# Patient Record
Sex: MALE | Race: WHITE | NOT HISPANIC OR LATINO | Employment: OTHER | RURAL
[De-identification: names, ages, dates, MRNs, and addresses within clinical notes are randomized per-mention and may not be internally consistent; named-entity substitution may affect disease eponyms.]

---

## 2017-06-09 ENCOUNTER — OFFICE VISIT (OUTPATIENT)
Dept: PODIATRY | Facility: CLINIC | Age: 77
End: 2017-06-09

## 2017-06-09 VITALS — HEIGHT: 70 IN | BODY MASS INDEX: 26.63 KG/M2 | WEIGHT: 186 LBS

## 2017-06-09 DIAGNOSIS — Z92.29 HX: LONG TERM ANTICOAGULANT USE: ICD-10-CM

## 2017-06-09 DIAGNOSIS — B35.1 ONYCHOMYCOSIS: ICD-10-CM

## 2017-06-09 DIAGNOSIS — M79.671 FOOT PAIN, BILATERAL: Primary | ICD-10-CM

## 2017-06-09 DIAGNOSIS — M79.672 FOOT PAIN, BILATERAL: Primary | ICD-10-CM

## 2017-06-09 PROCEDURE — 11721 DEBRIDE NAIL 6 OR MORE: CPT | Performed by: PODIATRIST

## 2017-06-09 PROCEDURE — 99202 OFFICE O/P NEW SF 15 MIN: CPT | Performed by: PODIATRIST

## 2017-06-09 RX ORDER — LEVOTHYROXINE SODIUM 0.07 MG/1
TABLET ORAL
COMMUNITY
Start: 2017-06-01 | End: 2019-09-12 | Stop reason: SDUPTHER

## 2017-06-09 RX ORDER — SIMVASTATIN 20 MG
TABLET ORAL
COMMUNITY
Start: 2017-04-10 | End: 2019-09-12 | Stop reason: SDUPTHER

## 2017-06-09 RX ORDER — PANTOPRAZOLE SODIUM 40 MG/1
TABLET, DELAYED RELEASE ORAL
COMMUNITY
Start: 2017-04-10 | End: 2019-09-12 | Stop reason: SDUPTHER

## 2017-06-09 RX ORDER — ASPIRIN 81 MG/1
81 TABLET ORAL DAILY
COMMUNITY
End: 2021-01-01 | Stop reason: HOSPADM

## 2017-06-09 NOTE — PROGRESS NOTES
Luiz Elise  1940  76 y.o. male     Patient presents today with his wife with a complaint of bilateral great toenail and bilateral foot pain (mainly in the arch).    6/9/2017  Chief Complaint   Patient presents with   • Left Foot - nail issue, Pain   • Right Foot - nail issue, Pain           History of Present Illness    76-year-old male presents clinic today with chief, to bilateral foot pain.  Pain is primarily located to the toenails.  Patient has been present for greater than one year.  Pain is relieved with nail trimming.  Patient is unable to trim his own nails and he is on long-term anticoagulant use.  Admits to occasional numbness, burning and tingling in his feet.  He states this is not painful though.  He has no other pedal complaints.         No past medical history on file.      No past surgical history on file.      No family history on file.      Social History     Social History   • Marital status:      Spouse name: N/A   • Number of children: N/A   • Years of education: N/A     Occupational History   • Not on file.     Social History Main Topics   • Smoking status: Current Every Day Smoker     Types: Cigarettes   • Smokeless tobacco: Not on file   • Alcohol use Not on file   • Drug use: Not on file   • Sexual activity: Not on file     Other Topics Concern   • Not on file     Social History Narrative   • No narrative on file         Current Outpatient Prescriptions   Medication Sig Dispense Refill   • apixaban (ELIQUIS) 5 MG tablet tablet Take 5 mg by mouth 2 (Two) Times a Day.     • aspirin 81 MG EC tablet Take 81 mg by mouth Daily.     • levothyroxine (SYNTHROID, LEVOTHROID) 75 MCG tablet      • metoprolol tartrate (LOPRESSOR) 25 MG tablet      • pantoprazole (PROTONIX) 40 MG EC tablet      • simvastatin (ZOCOR) 20 MG tablet      • tiotropium (SPIRIVA) 18 MCG per inhalation capsule Place 1 capsule into inhaler and inhale Daily.       No current facility-administered medications for this  "visit.          OBJECTIVE    Ht 69.5\" (176.5 cm)  Wt 186 lb (84.4 kg)  BMI 27.07 kg/m2      Review of Systems   Constitutional: Negative for chills and fever.   Cardiovascular: Negative for chest pain.   Gastrointestinal: Negative for constipation, diarrhea, nausea and vomiting.   Skin: Negative for wound. long painful toenails  Musculoskeletal: foot pain      Constitutional: well developed, well nourished    HEENT: Normocephalic and atraumatic, normal hearing    Respiratory: Non labored respirations noted    Cardiovascular:    DP/PT pulses palpable    CFT brisk  to all digits  Skin temp is warm to warm from proximal tibia to distal digits  Pedal hair growth present.   No erythema or edema noted   Diffuse varicosities noted bilateral LEs    Musculoskeletal:  Muscle strength is 5/5 for all muscle groups tested   ROM of the 1st MTP is full without pain or crepitus  ROM of the MTJ is full without pain or crepitus    ROM of the STJ is full without pain or crepitus    ROM of the ankle joint is full without pain or crepitus    No pain on palpation noted    Rectus foot type     Dermatological:   Nails 1-5 are thickened, discolored, elongated with subungual debris bilateral.  Skin is warm, dry and intact    Webspaces 1-4 bilateral are clean and intact. Left 4th webspace is macerated  No subcutaneous nodules or masses noted    No open wounds noted     Neurological:   Protective sensation intact    Sensation intact to light touch    DTR intact    Psychiatric: A&O x 3 with normal mood and affect. NAD.         Procedures        ASSESSMENT AND PLAN    Luiz was seen today for nail issue, pain, nail issue and pain.    Diagnoses and all orders for this visit:    Foot pain, bilateral    Onychomycosis    HX: long term anticoagulant use    - Nails 1 through 5 bilateral were debrided in length and thickness without incident utilizing nail nippers.  - recommended daily use of lotion. Avoid the webspaces  - All questions were answered " and the patient is in agreement with the current treatment plan.  - RTC in 3 months            This document has been electronically signed by Shantanu Gillespie DPM on June 9, 2017 3:27 PM     6/9/2017  3:27 PM    EMR Dragon/Transcription disclaimer:   Much of this encounter note is an electronic transcription/translation of spoken language to printed text. The electronic translation of spoken language may permit erroneous, or at times, nonsensical words or phrases to be inadvertently transcribed; Although I have reviewed the note for such errors, some may still exist.

## 2017-09-15 ENCOUNTER — OFFICE VISIT (OUTPATIENT)
Dept: PODIATRY | Facility: CLINIC | Age: 77
End: 2017-09-15

## 2017-09-15 VITALS — BODY MASS INDEX: 26.63 KG/M2 | HEIGHT: 70 IN | WEIGHT: 186 LBS

## 2017-09-15 DIAGNOSIS — B35.1 ONYCHOMYCOSIS: Primary | ICD-10-CM

## 2017-09-15 DIAGNOSIS — M79.671 FOOT PAIN, BILATERAL: ICD-10-CM

## 2017-09-15 DIAGNOSIS — M79.672 FOOT PAIN, BILATERAL: ICD-10-CM

## 2017-09-15 DIAGNOSIS — Z92.29 HX: LONG TERM ANTICOAGULANT USE: ICD-10-CM

## 2017-09-15 PROCEDURE — 11721 DEBRIDE NAIL 6 OR MORE: CPT | Performed by: PODIATRIST

## 2017-09-15 NOTE — PROGRESS NOTES
"Luiz Arik  1940  76 y.o. male     Patient presents today for nail care.     9/15/2017  Chief Complaint   Patient presents with   • Left Foot - nail care   • Right Foot - nail care           History of Present Illness    Patient returns to clinic today for routine nail care.  States his nails become long and painful especially when walking.  He describes pain as dull and achy.  Pain is relieved with removing.  He has no other pedal complaints.        No past medical history on file.      No past surgical history on file.      No family history on file.      Social History     Social History   • Marital status:      Spouse name: N/A   • Number of children: N/A   • Years of education: N/A     Occupational History   • Not on file.     Social History Main Topics   • Smoking status: Current Every Day Smoker     Types: Cigarettes   • Smokeless tobacco: Not on file   • Alcohol use Not on file   • Drug use: Not on file   • Sexual activity: Not on file     Other Topics Concern   • Not on file     Social History Narrative         Current Outpatient Prescriptions   Medication Sig Dispense Refill   • apixaban (ELIQUIS) 5 MG tablet tablet Take 5 mg by mouth 2 (Two) Times a Day.     • aspirin 81 MG EC tablet Take 81 mg by mouth Daily.     • levothyroxine (SYNTHROID, LEVOTHROID) 75 MCG tablet      • metoprolol tartrate (LOPRESSOR) 25 MG tablet      • pantoprazole (PROTONIX) 40 MG EC tablet      • simvastatin (ZOCOR) 20 MG tablet      • tiotropium (SPIRIVA) 18 MCG per inhalation capsule Place 1 capsule into inhaler and inhale Daily.       No current facility-administered medications for this visit.          OBJECTIVE    Ht 69.5\" (176.5 cm)  Wt 186 lb (84.4 kg)  BMI 27.07 kg/m2      Review of Systems   Constitutional: Negative for chills and fever.   Cardiovascular: Negative for chest pain.   Gastrointestinal: Negative for constipation, diarrhea, nausea and vomiting.   Skin: Negative for wound. long painful " toenails  Musculoskeletal: foot pain      Constitutional: well developed, well nourished    HEENT: Normocephalic and atraumatic, normal hearing    Respiratory: Non labored respirations noted    Cardiovascular:    DP/PT pulses palpable    CFT brisk  to all digits  Skin temp is warm to warm from proximal tibia to distal digits  Pedal hair growth present.   No erythema or edema noted   Diffuse varicosities noted bilateral LEs    Musculoskeletal:  Muscle strength is 5/5 for all muscle groups tested   ROM of the 1st MTP is full without pain or crepitus  ROM of the MTJ is full without pain or crepitus    ROM of the STJ is full without pain or crepitus    ROM of the ankle joint is full without pain or crepitus    No pain on palpation noted    Rectus foot type     Dermatological:   Nails 1-5 are thickened, discolored, elongated with subungual debris bilateral.  Skin is warm, dry and intact    Webspaces 1-4 bilateral are clean and intact. Left 4th webspace is macerated  No subcutaneous nodules or masses noted    No open wounds noted     Neurological:   Protective sensation intact    Sensation intact to light touch    DTR intact    Psychiatric: A&O x 3 with normal mood and affect. NAD.         Procedures        ASSESSMENT AND PLAN    Luiz was seen today for nail care and nail care.    Diagnoses and all orders for this visit:    Onychomycosis    Foot pain, bilateral    HX: long term anticoagulant use    - Nails 1 through 5 bilateral were debrided in length and thickness without incident utilizing nail nippers.  - All questions were answered and the patient is in agreement with the current treatment plan.  - RTC in 3 months            This document has been electronically signed by Shantanu Gillespie DPM on September 15, 2017 12:54 PM     9/15/2017  12:54 PM    EMR Dragon/Transcription disclaimer:   Much of this encounter note is an electronic transcription/translation of spoken language to printed text. The electronic  translation of spoken language may permit erroneous, or at times, nonsensical words or phrases to be inadvertently transcribed; Although I have reviewed the note for such errors, some may still exist.

## 2017-12-15 ENCOUNTER — OFFICE VISIT (OUTPATIENT)
Dept: PODIATRY | Facility: CLINIC | Age: 77
End: 2017-12-15

## 2017-12-15 VITALS — WEIGHT: 191 LBS | OXYGEN SATURATION: 98 % | HEART RATE: 78 BPM | HEIGHT: 70 IN | BODY MASS INDEX: 27.35 KG/M2

## 2017-12-15 DIAGNOSIS — B35.1 ONYCHOMYCOSIS: Primary | ICD-10-CM

## 2017-12-15 DIAGNOSIS — M79.672 FOOT PAIN, BILATERAL: ICD-10-CM

## 2017-12-15 DIAGNOSIS — Z92.29 HX: LONG TERM ANTICOAGULANT USE: ICD-10-CM

## 2017-12-15 DIAGNOSIS — M79.671 FOOT PAIN, BILATERAL: ICD-10-CM

## 2017-12-15 PROCEDURE — 11721 DEBRIDE NAIL 6 OR MORE: CPT | Performed by: PODIATRIST

## 2017-12-15 NOTE — PROGRESS NOTES
"Luiz Arik  1940  77 y.o. male     Patient presents today for routine nail care.     12/15/2017  Chief Complaint   Patient presents with   • Left Foot - nail care   • Right Foot - nail care           History of Present Illness    Patient returns to clinic today for routine nail care.  States his nails become long and painful especially when walking.  He describes pain as dull and achy.  Pain is relieved with removing.  He has no other pedal complaints.        Past Medical History:   Diagnosis Date   • Onychomycosis          Past Surgical History:   Procedure Laterality Date   • CHOLECYSTECTOMY     • EYE SURGERY     • GASTROSTOMY FEEDING TUBE INSERTION     • HEMORRHOIDECTOMY     • REPLACEMENT TOTAL KNEE Right          Family History   Problem Relation Age of Onset   • Heart disease Mother    • Cancer Father    • Polycythemia Brother          Social History     Social History   • Marital status:      Spouse name: N/A   • Number of children: N/A   • Years of education: N/A     Occupational History   • Not on file.     Social History Main Topics   • Smoking status: Current Every Day Smoker     Types: Cigarettes   • Smokeless tobacco: Never Used   • Alcohol use No   • Drug use: No   • Sexual activity: Defer     Other Topics Concern   • Not on file     Social History Narrative         Current Outpatient Prescriptions   Medication Sig Dispense Refill   • apixaban (ELIQUIS) 5 MG tablet tablet Take 5 mg by mouth 2 (Two) Times a Day.     • aspirin 81 MG EC tablet Take 81 mg by mouth Daily.     • levothyroxine (SYNTHROID, LEVOTHROID) 75 MCG tablet      • metoprolol tartrate (LOPRESSOR) 25 MG tablet      • pantoprazole (PROTONIX) 40 MG EC tablet      • simvastatin (ZOCOR) 20 MG tablet      • tiotropium (SPIRIVA) 18 MCG per inhalation capsule Place 1 capsule into inhaler and inhale Daily.       No current facility-administered medications for this visit.          OBJECTIVE    Pulse 78  Ht 176.5 cm (69.5\")  Wt 86.6 " kg (191 lb)  SpO2 98%  BMI 27.8 kg/m2      Review of Systems   Constitutional: Negative for chills and fever.   Cardiovascular: Negative for chest pain.   Gastrointestinal: Negative for constipation, diarrhea, nausea and vomiting.   Skin: Negative for wound. long painful toenails  Musculoskeletal: foot pain      Constitutional: well developed, well nourished    HEENT: Normocephalic and atraumatic, normal hearing    Respiratory: Non labored respirations noted    Cardiovascular:    DP/PT pulses palpable    CFT brisk  to all digits  Skin temp is warm to warm from proximal tibia to distal digits  Pedal hair growth present.   No erythema or edema noted   Diffuse varicosities noted bilateral LEs    Musculoskeletal:  Muscle strength is 5/5 for all muscle groups tested   ROM of the 1st MTP is full without pain or crepitus  ROM of the MTJ is full without pain or crepitus    ROM of the STJ is full without pain or crepitus    ROM of the ankle joint is full without pain or crepitus    No pain on palpation noted    Rectus foot type     Dermatological:   Nails 1-5 are thickened, discolored, elongated with subungual debris bilateral.  Skin is warm, dry and intact    Webspaces 1-4 bilateral are clean and intact. Left 4th webspace is macerated  No subcutaneous nodules or masses noted    No open wounds noted     Neurological:   Protective sensation intact    Sensation intact to light touch    DTR intact    Psychiatric: A&O x 3 with normal mood and affect. NAD.         Procedures        ASSESSMENT AND PLAN    Luiz was seen today for nail care and nail care.    Diagnoses and all orders for this visit:    Onychomycosis    Foot pain, bilateral    HX: long term anticoagulant use    - Nails 1-5 bilateral were debrided in length and thickness with nail nipper and electric  to decrease fungal load and risk of infection.   - All questions were answered    - RTC in 3 months            This document has been electronically signed by  Shantanu Gillespie DPM on December 15, 2017 11:25 AM     12/15/2017  11:25 AM

## 2018-03-16 ENCOUNTER — OFFICE VISIT (OUTPATIENT)
Dept: PODIATRY | Facility: CLINIC | Age: 78
End: 2018-03-16

## 2018-03-16 VITALS — BODY MASS INDEX: 28.28 KG/M2 | WEIGHT: 190.92 LBS | HEIGHT: 69 IN

## 2018-03-16 DIAGNOSIS — Z92.29 HX: LONG TERM ANTICOAGULANT USE: ICD-10-CM

## 2018-03-16 DIAGNOSIS — B35.1 ONYCHOMYCOSIS: Primary | ICD-10-CM

## 2018-03-16 DIAGNOSIS — M79.672 FOOT PAIN, BILATERAL: ICD-10-CM

## 2018-03-16 DIAGNOSIS — M79.671 FOOT PAIN, BILATERAL: ICD-10-CM

## 2018-03-16 PROCEDURE — 11721 DEBRIDE NAIL 6 OR MORE: CPT | Performed by: PODIATRIST

## 2018-03-16 NOTE — PROGRESS NOTES
"Luiz Arik  1940  77 y.o. male     Patient presents today for routine nail care.     3/16/2018  Chief Complaint   Patient presents with   • Left Foot - routine nail trim   • Right Foot - routine nail trim           History of Present Illness    Patient returns to clinic today for routine nail care.  States his nails become long and painful especially when walking.  Pain is relieved with debridement.  He has no other pedal complaints.        Past Medical History:   Diagnosis Date   • Onychomycosis          Past Surgical History:   Procedure Laterality Date   • CHOLECYSTECTOMY     • EYE SURGERY     • GASTROSTOMY FEEDING TUBE INSERTION     • HEMORRHOIDECTOMY     • REPLACEMENT TOTAL KNEE Right          Family History   Problem Relation Age of Onset   • Heart disease Mother    • Cancer Father    • Polycythemia Brother          Social History     Social History   • Marital status:      Spouse name: N/A   • Number of children: N/A   • Years of education: N/A     Occupational History   • Not on file.     Social History Main Topics   • Smoking status: Current Every Day Smoker     Types: Cigarettes   • Smokeless tobacco: Never Used   • Alcohol use No   • Drug use: No   • Sexual activity: Defer     Other Topics Concern   • Not on file     Social History Narrative   • No narrative on file         Current Outpatient Prescriptions   Medication Sig Dispense Refill   • apixaban (ELIQUIS) 5 MG tablet tablet Take 5 mg by mouth 2 (Two) Times a Day.     • aspirin 81 MG EC tablet Take 81 mg by mouth Daily.     • levothyroxine (SYNTHROID, LEVOTHROID) 75 MCG tablet      • metoprolol tartrate (LOPRESSOR) 25 MG tablet      • pantoprazole (PROTONIX) 40 MG EC tablet      • simvastatin (ZOCOR) 20 MG tablet      • tiotropium (SPIRIVA) 18 MCG per inhalation capsule Place 1 capsule into inhaler and inhale Daily.       No current facility-administered medications for this visit.          OBJECTIVE    Ht 176.5 cm (69.49\")   Wt 86.6 kg " (190 lb 14.7 oz)   BMI 27.80 kg/m²       Review of Systems   Constitutional: Negative for chills and fever.   Cardiovascular: Negative for chest pain.   Gastrointestinal: Negative for constipation, diarrhea, nausea and vomiting.   Skin: Negative for wound. long painful toenails  Musculoskeletal: foot pain      Constitutional: well developed, well nourished    HEENT: Normocephalic and atraumatic, normal hearing    Respiratory: Non labored respirations noted    Cardiovascular:    DP/PT pulses palpable    CFT brisk  to all digits  Diffuse varicosities noted bilateral LEs    Musculoskeletal:  Muscle strength is 5/5 for all muscle groups tested   Rectus foot type     Dermatological:   Nails 1-5 are thickened, discolored, elongated with subungual debris bilateral. POP to nail plates  Small fissure noted to plantar right 5th toe crease. Dried blood noted    Neurological:   Protective sensation intact    Sensation intact to light touch    DTR intact    Psychiatric: A&O x 3 with normal mood and affect. NAD.         Procedures        ASSESSMENT AND PLAN    Luiz was seen today for routine nail trim and routine nail trim.    Diagnoses and all orders for this visit:    Onychomycosis    Foot pain, bilateral    HX: long term anticoagulant use      - Nails 1-5 bilateral were debrided in length and thickness with nail nipper and electric  to decrease fungal load and risk of infection.   - bacitracin applied to fissure in crease of toe. Pt educated on signs of infection and told to contact the office immediately or go to emergency Department if needed  - All questions were answered    - RTC in 3 months            This document has been electronically signed by Shantanu Gillespie DPM on March 16, 2018 12:00 PM     3/16/2018  12:00 PM

## 2019-09-12 ENCOUNTER — OFFICE VISIT (OUTPATIENT)
Dept: FAMILY MEDICINE CLINIC | Facility: CLINIC | Age: 79
End: 2019-09-12

## 2019-09-12 VITALS
HEART RATE: 73 BPM | BODY MASS INDEX: 27.66 KG/M2 | DIASTOLIC BLOOD PRESSURE: 62 MMHG | TEMPERATURE: 98.2 F | SYSTOLIC BLOOD PRESSURE: 128 MMHG | OXYGEN SATURATION: 96 % | HEIGHT: 70 IN | WEIGHT: 193.2 LBS
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
## 2019-09-12 PROCEDURE — 99204 OFFICE O/P NEW MOD 45 MIN: CPT | Performed by: FAMILY MEDICINE

## 2019-09-12 RX ORDER — SERTRALINE HYDROCHLORIDE 25 MG/1
25 TABLET, FILM COATED ORAL NIGHTLY
COMMUNITY
End: 2019-09-12 | Stop reason: SDUPTHER

## 2019-09-12 RX ORDER — PANTOPRAZOLE SODIUM 40 MG/1
40 TABLET, DELAYED RELEASE ORAL DAILY
Qty: 90 TABLET | Refills: 1 | Status: SHIPPED | OUTPATIENT
Start: 2019-09-12 | End: 2020-03-09 | Stop reason: SDUPTHER

## 2019-09-12 RX ORDER — SIMVASTATIN 20 MG
20 TABLET ORAL NIGHTLY
Qty: 90 TABLET | Refills: 1 | Status: SHIPPED | OUTPATIENT
Start: 2019-09-12 | End: 2020-07-21

## 2019-09-12 RX ORDER — SERTRALINE HYDROCHLORIDE 25 MG/1
25 TABLET, FILM COATED ORAL NIGHTLY
Qty: 90 TABLET | Refills: 1 | Status: SHIPPED | OUTPATIENT
Start: 2019-09-12 | End: 2020-03-09 | Stop reason: SDUPTHER

## 2019-09-12 RX ORDER — LEVOTHYROXINE SODIUM 0.07 MG/1
75 TABLET ORAL DAILY
Qty: 90 TABLET | Refills: 1 | Status: SHIPPED | OUTPATIENT
Start: 2019-09-12 | End: 2020-04-14

## 2019-09-12 NOTE — PROGRESS NOTES
Subjective Pt of Dr. Alanis.  Practice has closed.    Luiz Elise is a 78 y.o. 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 Recent R Retinopathy receiving eye injections. Hypothyroidism. Chronic anemia, Dementia, and other problems, see PMH/PSH . Pt presents for exam, to est with a new PCP.    With Wife who gives most HPI  ' Need to est with new PCP, need management of problem as noted. Fell against chair bruised R ribs, had broke in past, hurts when breathing.'     Heart Problem   This is a chronic (CAD/MI, Stents, A-Fib) problem. The current episode started more than 1 year ago. The problem occurs daily. The problem has been waxing and waning. Associated symptoms include arthralgias, chest pain, fatigue and myalgias. Pertinent negatives include no abdominal pain, chills, congestion, coughing, diaphoresis, fever, headaches, joint swelling, nausea, neck pain, numbness, rash, sore throat, vomiting or weakness. The symptoms are aggravated by smoking. Treatments tried: Meds, Stenting. The treatment provided moderate relief.   Breathing Problem   He complains of difficulty breathing. There is no cough, shortness of breath or wheezing. Primary symptoms comments: COPD. This is a chronic problem. The current episode started more than 1 year ago. The problem occurs daily. The problem has been waxing and waning. Associated symptoms include chest pain, myalgias and postnasal drip. Pertinent negatives include no appetite change, ear pain, fever, headaches, rhinorrhea, sneezing, sore throat or trouble swallowing. His symptoms are aggravated by exposure to smoke. His symptoms are alleviated by beta-agonist and ipratropium. He reports moderate improvement on treatment. Risk factors for lung disease include smoking/tobacco exposure. His past medical history is significant for COPD.    Hypothyroidism   This is a chronic problem. The current episode started more than 1 year ago. The problem occurs daily. The problem has been waxing and waning. Associated symptoms include arthralgias, chest pain, fatigue and myalgias. Pertinent negatives include no abdominal pain, chills, congestion, coughing, diaphoresis, fever, headaches, joint swelling, nausea, neck pain, numbness, rash, sore throat, vomiting or weakness. Treatments tried: Levothyroxine. The treatment provided moderate relief.   Hypertension   This is a chronic problem. The current episode started more than 1 year ago. The problem has been waxing and waning since onset. The problem is controlled. Associated symptoms include chest pain and palpitations. Pertinent negatives include no headaches, neck pain or shortness of breath. Agents associated with hypertension include thyroid hormones. Risk factors for coronary artery disease include male gender and smoking/tobacco exposure. Past treatments include beta blockers. Current antihypertension treatment includes beta blockers. The current treatment provides significant improvement. Hypertensive end-organ damage includes CVA.   Anemia   Presents for initial visit. Symptoms include bruises/bleeds easily, confusion and palpitations. There has been no abdominal pain, fever, light-headedness or pallor. Past treatments include nothing. Past medical history includes hypothyroidism.   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 This is a chronic problem. The problem occurs daily. The problem has been unchanged. Pertinent negatives include no eye discharge, eye redness, fever, itching, nausea, photophobia, vomiting or weakness. Treatments tried: Injections. The treatment provided mild relief.   Memory Loss   This is a chronic (Dementia per wife x 5-7 yrs.) problem. The current episode started more than 1 year ago. The problem occurs daily. The problem has been gradually  worsening. Associated symptoms include arthralgias, chest pain, fatigue and myalgias. Pertinent negatives include no abdominal pain, chills, congestion, coughing, diaphoresis, fever, headaches, joint swelling, nausea, neck pain, numbness, rash, sore throat, vomiting or weakness. The symptoms are aggravated by smoking (CVA, CAD). Treatments tried: Meds, Risk reduction. The treatment provided no relief.   Cerebrovascular Accident   Chronicity: L sided weakness, impaire swallow. The current episode started more than 1 year ago (2 yrs ago). The problem occurs daily. The problem has been gradually improving. Associated symptoms include arthralgias, chest pain, fatigue and myalgias. Pertinent negatives include no abdominal pain, chills, congestion, coughing, diaphoresis, fever, headaches, joint swelling, nausea, neck pain, numbness, rash, sore throat, vomiting or weakness. Treatments tried: PT/ OT. The treatment provided significant relief.   Fall   The accident occurred 12 to 24 hours ago. The fall occurred while standing. He fell from a height of 3 to 5 ft. He landed on hard floor (back of chair). Point of impact: R ribs. Pain location: R ribs. The pain is at a severity of 5/10. Pertinent negatives include no abdominal pain, fever, headaches, hematuria, nausea, numbness or vomiting. He has tried acetaminophen for the symptoms. The treatment provided mild relief.        The following portions of the patient's history were reviewed and updated as appropriate: allergies, current medications, past family history, past medical history, past social history, past surgical history and problem list.    Review of Systems   Constitutional: Positive for fatigue. Negative for activity change, appetite change, chills, diaphoresis, fever and unexpected weight change.   HENT: Positive for postnasal drip. Negative for congestion, dental problem, drooling, ear discharge, ear pain, facial swelling, hearing loss, mouth sores, nosebleeds,  rhinorrhea, sinus pressure, sinus pain, sneezing, sore throat, tinnitus, trouble swallowing and voice change.         Dentures   Eyes: Positive for visual disturbance. Negative for photophobia, pain, discharge, redness and itching.        R MD   Blind OS     [facility]    [facility]   Respiratory: Negative for apnea, cough, choking, chest tightness, shortness of breath, wheezing and stridor.         COPD.    Cardiovascular: Positive for chest pain and palpitations. Negative for leg swelling.         A-fib.    Gastrointestinal: Negative for abdominal distention, abdominal pain, anal bleeding, blood in stool, constipation, diarrhea, nausea, rectal pain and vomiting.        Bloats since CVA, S/P feeding tube.   Endocrine: Positive for cold intolerance. Negative for heat intolerance, polydipsia, polyphagia and polyuria.   Genitourinary: Positive for difficulty urinating. Negative for decreased urine volume, discharge, dysuria, enuresis, flank pain, frequency, genital sores, hematuria, penile pain, penile swelling, scrotal swelling, testicular pain and urgency.        Umbilical hernia.    Musculoskeletal: Positive for arthralgias, back pain and myalgias. Negative for gait problem, joint swelling, neck pain and neck stiffness.        Feet hurt   Skin: Negative for color change, pallor, rash and wound.        Toe nail fungus.     H/O R toe surgery.     Moles on back   Allergic/Immunologic: Positive for environmental allergies. Negative for food allergies and immunocompromised state.   Neurological: Positive for dizziness and syncope. Negative for tremors, seizures, facial asymmetry, speech difficulty, weakness, light-headedness, numbness and headaches.        L arm jerk.    Hematological: Negative for adenopathy. Bruises/bleeds easily.   Psychiatric/Behavioral: Positive for confusion and hallucinations. Negative for agitation, behavioral problems, decreased concentration, dysphoric mood, self-injury, sleep disturbance and  suicidal ideas. The patient is nervous/anxious. The patient is not hyperactive.        Objective   Physical Exam   Constitutional: He is oriented to person, place, and time. He appears well-developed and well-nourished. No distress.   HENT:   Head: Normocephalic.   Right Ear: External ear normal.   Left Ear: External ear normal.   Nose: Nose normal.   Mouth/Throat: Oropharynx is clear and moist. No oropharyngeal exudate.   Eyes: Conjunctivae are normal. Right eye exhibits no discharge. Left eye exhibits no discharge. No scleral icterus.   Blind out turning L eye.     R eye MI PRL.    Neck: No JVD present. No tracheal deviation present. No thyromegaly present.   Cardiovascular: Normal rate and normal heart sounds. Exam reveals no gallop and no friction rub.   No murmur heard.  Pulmonary/Chest: Effort normal and breath sounds normal. No stridor. No respiratory distress. He has no wheezes. He has no rales. He exhibits no tenderness.   Abdominal: Bowel sounds are normal. He exhibits no distension and no mass. There is no tenderness. There is no rebound and no guarding. A hernia is present.   Has 50 mm reducible umbilical hernia.    Musculoskeletal: Normal range of motion. He exhibits no edema, tenderness or deformity.   Lymphadenopathy:     He has no cervical adenopathy.   Neurological: He is alert and oriented to person, place, and time. He has normal reflexes. He displays normal reflexes. A cranial nerve deficit and sensory deficit is present. He exhibits normal muscle tone. Coordination normal.   L arm strenght 4/5.   Romberg +  Recog 3/3   Recall 1/3     Skin: Skin is warm and dry. Capillary refill takes 2 to 3 seconds. No rash noted. He is not diaphoretic. No erythema. No pallor.   Psychiatric: His behavior is normal.   Affect flat  Mood congruent.    Nursing note and vitals reviewed.      Assessment/Plan   Luiz was seen today for establish care and dizziness.    Diagnoses and all orders for this visit:    Fall  against object  Comments:  Hit R ribs on chair falling, with episodes A-fib  Orders:  -     XR Chest PA & Lateral (In Office)    Other orders  -     simvastatin (ZOCOR) 20 MG tablet; Take 1 tablet by mouth Every Night.  -     pantoprazole (PROTONIX) 40 MG EC tablet; Take 1 tablet by mouth Daily.  -     levothyroxine (SYNTHROID, LEVOTHROID) 75 MCG tablet; Take 1 tablet by mouth Daily.  -     sertraline (ZOLOFT) 25 MG tablet; Take 1 tablet by mouth Every Night.    Discussed exam, health problems, fall, checking x-ray. Discussed meds, indications, Tx plan, rationale. Discussed obtaining past records. Discussed F/U with specialist. Discussed F/U here.          This document has been electronically signed by Bryce Collazo MD

## 2019-09-15 NOTE — PATIENT INSTRUCTIONS
Preventive Care 65 Years and Older, Male  Preventive care refers to lifestyle choices and visits with your health care provider that can promote health and wellness.  What does preventive care include?    · A yearly physical exam. This is also called an annual well check.  · Dental exams once or twice a year.  · Routine eye exams. Ask your health care provider how often you should have your eyes checked.  · Personal lifestyle choices, including:  ? Daily care of your teeth and gums.  ? Regular physical activity.  ? Eating a healthy diet.  ? Avoiding tobacco and drug use.  ? Limiting alcohol use.  ? Practicing safe sex.  ? Taking low doses of aspirin every day.  ? Taking vitamin and mineral supplements as recommended by your health care provider.  What happens during an annual well check?  The services and screenings done by your health care provider during your annual well check will depend on your age, overall health, lifestyle risk factors, and family history of disease.  Counseling  Your health care provider may ask you questions about your:  · Alcohol use.  · Tobacco use.  · Drug use.  · Emotional well-being.  · Home and relationship well-being.  · Sexual activity.  · Eating habits.  · History of falls.  · Memory and ability to understand (cognition).  · Work and work environment.  Screening  You may have the following tests or measurements:  · Height, weight, and BMI.  · Blood pressure.  · Lipid and cholesterol levels. These may be checked every 5 years, or more frequently if you are over 50 years old.  · Skin check.  · Lung cancer screening. You may have this screening every year starting at age 55 if you have a 30-pack-year history of smoking and currently smoke or have quit within the past 15 years.  · Colorectal cancer screening. All adults should have this screening starting at age 50 and continuing until age 75. You will have tests every 1-10 years, depending on your results and the type of screening  test. People at increased risk should start screening at an earlier age. Screening tests may include:  ? Guaiac-based fecal occult blood testing.  ? Fecal immunochemical test (FIT).  ? Stool DNA test.  ? Virtual colonoscopy.  ? Sigmoidoscopy. During this test, a flexible tube with a tiny camera (sigmoidoscope) is used to examine your rectum and lower colon. The sigmoidoscope is inserted through your anus into your rectum and lower colon.  ? Colonoscopy. During this test, a long, thin, flexible tube with a tiny camera (colonoscope) is used to examine your entire colon and rectum.  · Prostate cancer screening. Recommendations will vary depending on your family history and other risks.  · Hepatitis C blood test.  · Hepatitis B blood test.  · Sexually transmitted disease (STD) testing.  · Diabetes screening. This is done by checking your blood sugar (glucose) after you have not eaten for a while (fasting). You may have this done every 1-3 years.  · Abdominal aortic aneurysm (AAA) screening. You may need this if you are a current or former smoker.  · Osteoporosis. You may be screened starting at age 70 if you are at high risk.  Talk with your health care provider about your test results, treatment options, and if necessary, the need for more tests.  Vaccines  Your health care provider may recommend certain vaccines, such as:  · Influenza vaccine. This is recommended every year.  · Tetanus, diphtheria, and acellular pertussis (Tdap, Td) vaccine. You may need a Td booster every 10 years.  · Varicella vaccine. You may need this if you have not been vaccinated.  · Zoster vaccine. You may need this after age 60.  · Measles, mumps, and rubella (MMR) vaccine. You may need at least one dose of MMR if you were born in 1957 or later. You may also need a second dose.  · Pneumococcal 13-valent conjugate (PCV13) vaccine. One dose is recommended after age 65.  · Pneumococcal polysaccharide (PPSV23) vaccine. One dose is recommended  after age 65.  · Meningococcal vaccine. You may need this if you have certain conditions.  · Hepatitis A vaccine. You may need this if you have certain conditions or if you travel or work in places where you may be exposed to hepatitis A.  · Hepatitis B vaccine. You may need this if you have certain conditions or if you travel or work in places where you may be exposed to hepatitis B.  · Haemophilus influenzae type b (Hib) vaccine. You may need this if you have certain risk factors.  Talk to your health care provider about which screenings and vaccines you need and how often you need them.  This information is not intended to replace advice given to you by your health care provider. Make sure you discuss any questions you have with your health care provider.  Document Released: 01/13/2017 Document Revised: 02/07/2019 Document Reviewed: 10/18/2016  [employer] Interactive Patient Education © 2019 [employer]

## 2019-09-17 ENCOUNTER — TELEPHONE (OUTPATIENT)
Dept: FAMILY MEDICINE CLINIC | Facility: CLINIC | Age: 79
End: 2019-09-17

## 2019-09-17 NOTE — TELEPHONE ENCOUNTER
----- Message from Bryce Collazo MD sent at 9/13/2019 12:29 PM CDT -----  Old Rib Fx, No new ones seen

## 2019-10-10 ENCOUNTER — OFFICE VISIT (OUTPATIENT)
Dept: FAMILY MEDICINE CLINIC | Facility: CLINIC | Age: 79
End: 2019-10-10

## 2019-10-10 VITALS
DIASTOLIC BLOOD PRESSURE: 62 MMHG | HEIGHT: 70 IN | WEIGHT: 197.2 LBS | OXYGEN SATURATION: 97 % | SYSTOLIC BLOOD PRESSURE: 130 MMHG | BODY MASS INDEX: 28.23 KG/M2 | HEART RATE: 85 BPM | TEMPERATURE: 98.1 F

## 2019-10-10 DIAGNOSIS — Z23 NEED FOR IMMUNIZATION AGAINST INFLUENZA: ICD-10-CM

## 2019-10-10 DIAGNOSIS — L30.9 DERMATITIS: ICD-10-CM

## 2019-10-10 DIAGNOSIS — F33.41 RECURRENT MAJOR DEPRESSIVE DISORDER, IN PARTIAL REMISSION (HCC): ICD-10-CM

## 2019-10-10 DIAGNOSIS — Z79.01 LONG TERM (CURRENT) USE OF ANTICOAGULANTS: ICD-10-CM

## 2019-10-10 DIAGNOSIS — K21.9 GERD WITHOUT ESOPHAGITIS: ICD-10-CM

## 2019-10-10 DIAGNOSIS — I48.11 LONGSTANDING PERSISTENT ATRIAL FIBRILLATION (HCC): ICD-10-CM

## 2019-10-10 DIAGNOSIS — Z00.00 ROUTINE MEDICAL EXAM: ICD-10-CM

## 2019-10-10 DIAGNOSIS — F03.90 DEMENTIA WITHOUT BEHAVIORAL DISTURBANCE, UNSPECIFIED DEMENTIA TYPE: ICD-10-CM

## 2019-10-10 DIAGNOSIS — E03.9 HYPOTHYROIDISM, UNSPECIFIED TYPE: ICD-10-CM

## 2019-10-10 DIAGNOSIS — J42 CHRONIC BRONCHITIS, UNSPECIFIED CHRONIC BRONCHITIS TYPE (HCC): ICD-10-CM

## 2019-10-10 DIAGNOSIS — Z86.73 HISTORY OF STROKE: Primary | ICD-10-CM

## 2019-10-10 PROBLEM — J44.9 COPD (CHRONIC OBSTRUCTIVE PULMONARY DISEASE) (HCC): Status: ACTIVE | Noted: 2019-10-10

## 2019-10-10 PROCEDURE — 90653 IIV ADJUVANT VACCINE IM: CPT | Performed by: FAMILY MEDICINE

## 2019-10-10 PROCEDURE — G0008 ADMIN INFLUENZA VIRUS VAC: HCPCS | Performed by: FAMILY MEDICINE

## 2019-10-10 PROCEDURE — 99214 OFFICE O/P EST MOD 30 MIN: CPT | Performed by: FAMILY MEDICINE

## 2019-10-10 NOTE — PROGRESS NOTES
Subjective   Luiz Elise is a 78 y.o. 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 Recent R Retinopathy receiving eye injections. Hypothyroidism. Chronic anemia, Dementia, and other problems, see PMH/PSH . Pt presents for exam, to discuss health problems, tx and F/U plans.  ' B/P has been OK, breathing OK. Memory about the same. Had labs from Dr. Alanis to go over',      Heart Problem   This is a chronic (CAD/MI, Stents, A-Fib) problem. The current episode started more than 1 year ago. The problem occurs daily. The problem has been waxing and waning. Associated symptoms include arthralgias, chest pain, fatigue and myalgias. Pertinent negatives include no abdominal pain, chills, congestion, coughing, diaphoresis, fever, headaches, joint swelling, nausea, neck pain, numbness, rash, sore throat, vomiting or weakness. The symptoms are aggravated by smoking. Treatments tried: Meds, Stenting. The treatment provided moderate relief.   Breathing Problem   He complains of difficulty breathing. There is no cough, shortness of breath or wheezing. Primary symptoms comments: COPD. This is a chronic problem. The current episode started more than 1 year ago. The problem occurs daily. The problem has been waxing and waning. Associated symptoms include chest pain, myalgias and postnasal drip. Pertinent negatives include no appetite change, ear pain, fever, headaches, rhinorrhea, sneezing, sore throat or trouble swallowing. His symptoms are aggravated by exposure to smoke. His symptoms are alleviated by beta-agonist and ipratropium. He reports moderate improvement on treatment. Risk factors for lung disease include smoking/tobacco exposure. His past medical history is significant for COPD.   Hypothyroidism   This is a chronic problem. The current episode started more than 1 year ago. The  problem occurs daily. The problem has been waxing and waning. Associated symptoms include arthralgias, chest pain, fatigue and myalgias. Pertinent negatives include no abdominal pain, chills, congestion, coughing, diaphoresis, fever, headaches, joint swelling, nausea, neck pain, numbness, rash, sore throat, vomiting or weakness. Treatments tried: Levothyroxine. The treatment provided moderate relief.   Hypertension   This is a chronic problem. The current episode started more than 1 year ago. The problem has been waxing and waning since onset. The problem is controlled. Associated symptoms include chest pain and palpitations. Pertinent negatives include no headaches, neck pain or shortness of breath. Agents associated with hypertension include thyroid hormones. Risk factors for coronary artery disease include male gender and smoking/tobacco exposure. Past treatments include beta blockers. Current antihypertension treatment includes beta blockers. The current treatment provides significant improvement. Hypertensive end-organ damage includes CVA.   Anemia   Presents for initial visit. Symptoms include bruises/bleeds easily, confusion and palpitations. There has been no abdominal pain, fever, light-headedness or pallor. Past treatments include nothing. Past medical history includes hypothyroidism.   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 This is a chronic problem. The problem occurs daily. The problem has been unchanged. Pertinent negatives include no eye discharge, eye redness, fever, itching, nausea, photophobia, vomiting or weakness. Treatments tried: Injections. The treatment provided mild relief.   Memory Loss   This is a chronic (Dementia per wife x 5-7 yrs.) problem. The current episode started more than 1 year ago. The problem occurs daily. The problem has been gradually worsening. Associated symptoms include arthralgias, chest pain, fatigue and myalgias. Pertinent negatives  include no abdominal pain, chills, congestion, coughing, diaphoresis, fever, headaches, joint swelling, nausea, neck pain, numbness, rash, sore throat, vomiting or weakness. The symptoms are aggravated by smoking (CVA, CAD). Treatments tried: Meds, Risk reduction. The treatment provided no relief.   Cerebrovascular Accident   Chronicity: L sided weakness, impaire swallow. The current episode started more than 1 year ago (2 yrs ago). The problem occurs daily. The problem has been gradually improving. Associated symptoms include arthralgias, chest pain, fatigue and myalgias. Pertinent negatives include no abdominal pain, chills, congestion, coughing, diaphoresis, fever, headaches, joint swelling, nausea, neck pain, numbness, rash, sore throat, vomiting or weakness. Treatments tried: PT/ OT. The treatment provided significant relief.   Fall   The accident occurred 12 to 24 hours ago. The fall occurred while standing. He fell from a height of 3 to 5 ft. He landed on hard floor (back of chair). Point of impact: R ribs. Pain location: R ribs. The pain is at a severity of 5/10. Pertinent negatives include no abdominal pain, fever, headaches, hematuria, nausea, numbness or vomiting. He has tried acetaminophen for the symptoms. The treatment provided mild relief.        The following portions of the patient's history were reviewed and updated as appropriate: allergies, current medications, past family history, past medical history, past social history, past surgical history and problem list.    Review of Systems   Constitutional: Positive for fatigue. Negative for activity change, appetite change, chills, diaphoresis, fever and unexpected weight change.   HENT: Positive for postnasal drip. Negative for congestion, dental problem, drooling, ear discharge, ear pain, facial swelling, hearing loss, mouth sores, nosebleeds, rhinorrhea, sinus pressure, sinus pain, sneezing, sore throat, tinnitus, trouble swallowing and voice  change.         Dentures   Eyes: Positive for visual disturbance. Negative for photophobia, pain, discharge, redness and itching.        R MD   Blind OS     [facility]    [facility]   Respiratory: Negative for apnea, cough, choking, chest tightness, shortness of breath, wheezing and stridor.         COPD.    Cardiovascular: Positive for chest pain and palpitations. Negative for leg swelling.         A-fib.    Gastrointestinal: Negative for abdominal distention, abdominal pain, anal bleeding, blood in stool, constipation, diarrhea, nausea, rectal pain and vomiting.        Bloats since CVA, S/P feeding tube.   Endocrine: Positive for cold intolerance. Negative for heat intolerance, polydipsia, polyphagia and polyuria.   Genitourinary: Positive for difficulty urinating. Negative for decreased urine volume, discharge, dysuria, enuresis, flank pain, frequency, genital sores, hematuria, penile pain, penile swelling, scrotal swelling, testicular pain and urgency.        Umbilical hernia.    Musculoskeletal: Positive for arthralgias, back pain and myalgias. Negative for gait problem, joint swelling, neck pain and neck stiffness.        Feet hurt   Skin: Negative for color change, pallor, rash and wound.        Toe nail fungus.     H/O R toe surgery.     Moles on back   Allergic/Immunologic: Positive for environmental allergies. Negative for food allergies and immunocompromised state.   Neurological: Positive for dizziness and syncope. Negative for tremors, seizures, facial asymmetry, speech difficulty, weakness, light-headedness, numbness and headaches.        L arm jerk.    Hematological: Negative for adenopathy. Bruises/bleeds easily.   Psychiatric/Behavioral: Positive for confusion and hallucinations. Negative for agitation, behavioral problems, decreased concentration, dysphoric mood, self-injury, sleep disturbance and suicidal ideas. The patient is nervous/anxious. The patient is not hyperactive.        Objective    Physical Exam   Constitutional: He is oriented to person, place, and time. He appears well-developed and well-nourished. No distress.   HENT:   Head: Normocephalic.   Right Ear: External ear normal.   Left Ear: External ear normal.   Nose: Nose normal.   Mouth/Throat: Oropharynx is clear and moist. No oropharyngeal exudate.   Eyes: Conjunctivae are normal. Right eye exhibits no discharge. Left eye exhibits no discharge. No scleral icterus.   Blind out turning L eye.     R eye MI PRL.    Neck: No JVD present. No tracheal deviation present. No thyromegaly present.   Cardiovascular: Normal rate and normal heart sounds. Exam reveals no gallop and no friction rub.   No murmur heard.  Pulmonary/Chest: Effort normal and breath sounds normal. No stridor. No respiratory distress. He has no wheezes. He has no rales. He exhibits no tenderness.   Abdominal: Bowel sounds are normal. He exhibits no distension and no mass. There is no tenderness. There is no rebound and no guarding. A hernia is present.   Has 50 mm reducible umbilical hernia.    Musculoskeletal: Normal range of motion. He exhibits no edema, tenderness or deformity.   Lymphadenopathy:     He has no cervical adenopathy.   Neurological: He is alert and oriented to person, place, and time. He has normal reflexes. He displays normal reflexes. A cranial nerve deficit and sensory deficit is present. He exhibits normal muscle tone. Coordination normal.   L arm strenght 4/5.   Romberg +  Recog 3/3   Recall 1/3     Skin: Skin is warm and dry. Capillary refill takes 2 to 3 seconds. No rash noted. He is not diaphoretic. No erythema. No pallor.   Psychiatric: His behavior is normal.   Affect flat  Mood congruent.    Nursing note and vitals reviewed.      Assessment/Plan   Luiz was seen today for fall.    Diagnoses and all orders for this visit:    History of stroke    Chronic bronchitis, unspecified chronic bronchitis type (CMS/HCC)  Comments:  Spiriva    Hypothyroidism,  unspecified type  Comments:  Levothyroxine  Orders:  -     TSH; Future    Routine medical exam  -     CBC & Differential; Future  -     Comprehensive metabolic panel; Future  -     TSH; Future  -     Lipid Panel; Future  -     Urinalysis With Culture If Indicated -; Future    Need for immunization against influenza  -     Fluad Quad >65 years ([id number])    GERD without esophagitis  Comments:  Protonix    Longstanding persistent atrial fibrillation  Comments:  Eliquis, Lopressor    Long term (current) use of anticoagulants    Recurrent major depressive disorder, in partial remission (CMS/[facility])  Comments:  Zoloft    Dementia without behavioral disturbance, unspecified dementia type (CMS/[facility])    Dermatitis  Comments:  Kenalog oint    Other orders  -     Discontinue: triamcinolone (KENALOG) 0.1 % ointment; Apply  topically to the appropriate area as directed 2 (Two) Times a Day. Psoriasis    Discussed exam, health problems, meds, indications, tx plan, rationale. Discussed USPSTF recommendations. Discussed F/U with specialist. Discussed F/U here.          This document has been electronically signed by Bryce Collazo MD

## 2019-10-23 PROBLEM — F33.41 RECURRENT MAJOR DEPRESSIVE DISORDER, IN PARTIAL REMISSION (HCC): Status: ACTIVE | Noted: 2019-10-23

## 2019-10-23 PROBLEM — K21.9 GERD WITHOUT ESOPHAGITIS: Status: ACTIVE | Noted: 2019-10-23

## 2019-10-23 PROBLEM — I48.11 LONGSTANDING PERSISTENT ATRIAL FIBRILLATION (HCC): Status: ACTIVE | Noted: 2019-10-23

## 2019-10-23 PROBLEM — Z00.00 ROUTINE MEDICAL EXAM: Status: ACTIVE | Noted: 2019-10-23

## 2019-10-23 PROBLEM — I48.91 ATRIAL FIBRILLATION, NEW ONSET (HCC): Status: ACTIVE | Noted: 2019-10-23

## 2019-10-23 PROBLEM — Z23 NEED FOR IMMUNIZATION AGAINST INFLUENZA: Status: ACTIVE | Noted: 2019-10-23

## 2019-10-23 PROBLEM — F03.90 DEMENTIA WITHOUT BEHAVIORAL DISTURBANCE (HCC): Status: ACTIVE | Noted: 2019-10-23

## 2019-10-23 PROBLEM — Z86.73 HISTORY OF STROKE: Status: ACTIVE | Noted: 2019-10-23

## 2019-10-23 PROBLEM — L30.9 DERMATITIS: Status: ACTIVE | Noted: 2019-10-23

## 2019-10-23 PROBLEM — Z79.01 LONG TERM (CURRENT) USE OF ANTICOAGULANTS: Status: ACTIVE | Noted: 2019-10-23

## 2019-10-24 NOTE — PATIENT INSTRUCTIONS
Preventive Care 65 Years and Older, Male  Preventive care refers to lifestyle choices and visits with your health care provider that can promote health and wellness.  What does preventive care include?    · A yearly physical exam. This is also called an annual well check.  · Dental exams once or twice a year.  · Routine eye exams. Ask your health care provider how often you should have your eyes checked.  · Personal lifestyle choices, including:  ? Daily care of your teeth and gums.  ? Regular physical activity.  ? Eating a healthy diet.  ? Avoiding tobacco and drug use.  ? Limiting alcohol use.  ? Practicing safe sex.  ? Taking low doses of aspirin every day.  ? Taking vitamin and mineral supplements as recommended by your health care provider.  What happens during an annual well check?  The services and screenings done by your health care provider during your annual well check will depend on your age, overall health, lifestyle risk factors, and family history of disease.  Counseling  Your health care provider may ask you questions about your:  · Alcohol use.  · Tobacco use.  · Drug use.  · Emotional well-being.  · Home and relationship well-being.  · Sexual activity.  · Eating habits.  · History of falls.  · Memory and ability to understand (cognition).  · Work and work environment.  Screening  You may have the following tests or measurements:  · Height, weight, and BMI.  · Blood pressure.  · Lipid and cholesterol levels. These may be checked every 5 years, or more frequently if you are over 50 years old.  · Skin check.  · Lung cancer screening. You may have this screening every year starting at age 55 if you have a 30-pack-year history of smoking and currently smoke or have quit within the past 15 years.  · Colorectal cancer screening. All adults should have this screening starting at age 50 and continuing until age 75. You will have tests every 1-10 years, depending on your results and the type of screening  test. People at increased risk should start screening at an earlier age. Screening tests may include:  ? Guaiac-based fecal occult blood testing.  ? Fecal immunochemical test (FIT).  ? Stool DNA test.  ? Virtual colonoscopy.  ? Sigmoidoscopy. During this test, a flexible tube with a tiny camera (sigmoidoscope) is used to examine your rectum and lower colon. The sigmoidoscope is inserted through your anus into your rectum and lower colon.  ? Colonoscopy. During this test, a long, thin, flexible tube with a tiny camera (colonoscope) is used to examine your entire colon and rectum.  · Prostate cancer screening. Recommendations will vary depending on your family history and other risks.  · Hepatitis C blood test.  · Hepatitis B blood test.  · Sexually transmitted disease (STD) testing.  · Diabetes screening. This is done by checking your blood sugar (glucose) after you have not eaten for a while (fasting). You may have this done every 1-3 years.  · Abdominal aortic aneurysm (AAA) screening. You may need this if you are a current or former smoker.  · Osteoporosis. You may be screened starting at age 70 if you are at high risk.  Talk with your health care provider about your test results, treatment options, and if necessary, the need for more tests.  Vaccines  Your health care provider may recommend certain vaccines, such as:  · Influenza vaccine. This is recommended every year.  · Tetanus, diphtheria, and acellular pertussis (Tdap, Td) vaccine. You may need a Td booster every 10 years.  · Varicella vaccine. You may need this if you have not been vaccinated.  · Zoster vaccine. You may need this after age 60.  · Measles, mumps, and rubella (MMR) vaccine. You may need at least one dose of MMR if you were born in 1957 or later. You may also need a second dose.  · Pneumococcal 13-valent conjugate (PCV13) vaccine. One dose is recommended after age 65.  · Pneumococcal polysaccharide (PPSV23) vaccine. One dose is recommended  after age 65.  · Meningococcal vaccine. You may need this if you have certain conditions.  · Hepatitis A vaccine. You may need this if you have certain conditions or if you travel or work in places where you may be exposed to hepatitis A.  · Hepatitis B vaccine. You may need this if you have certain conditions or if you travel or work in places where you may be exposed to hepatitis B.  · Haemophilus influenzae type b (Hib) vaccine. You may need this if you have certain risk factors.  Talk to your health care provider about which screenings and vaccines you need and how often you need them.  This information is not intended to replace advice given to you by your health care provider. Make sure you discuss any questions you have with your health care provider.  Document Released: 01/13/2017 Document Revised: 02/07/2019 Document Reviewed: 10/18/2016  [employer] Interactive Patient Education © 2019 [employer]

## 2019-12-13 ENCOUNTER — LAB (OUTPATIENT)
Dept: LAB | Facility: HOSPITAL | Age: 79
End: 2019-12-13

## 2019-12-13 DIAGNOSIS — E03.9 HYPOTHYROIDISM, UNSPECIFIED TYPE: ICD-10-CM

## 2019-12-13 DIAGNOSIS — Z00.00 ROUTINE MEDICAL EXAM: ICD-10-CM

## 2019-12-13 PROCEDURE — 80053 COMPREHEN METABOLIC PANEL: CPT

## 2019-12-13 PROCEDURE — 80061 LIPID PANEL: CPT

## 2019-12-13 PROCEDURE — 84443 ASSAY THYROID STIM HORMONE: CPT

## 2019-12-13 PROCEDURE — 85025 COMPLETE CBC W/AUTO DIFF WBC: CPT

## 2019-12-14 LAB
ALBUMIN SERPL-MCNC: 4.4 G/DL (ref 3.5–5.2)
ALBUMIN/GLOB SERPL: 1.2 G/DL
ALP SERPL-CCNC: 63 U/L (ref 39–117)
ALT SERPL W P-5'-P-CCNC: 16 U/L (ref 1–41)
ANION GAP SERPL CALCULATED.3IONS-SCNC: 13.7 MMOL/L (ref 5–15)
AST SERPL-CCNC: 25 U/L (ref 1–40)
BASOPHILS # BLD AUTO: 0.04 10*3/MM3 (ref 0–0.2)
BASOPHILS NFR BLD AUTO: 0.7 % (ref 0–1.5)
BILIRUB SERPL-MCNC: 0.3 MG/DL (ref 0.2–1.2)
BUN BLD-MCNC: 9 MG/DL (ref 8–23)
BUN/CREAT SERPL: 10.6 (ref 7–25)
CALCIUM SPEC-SCNC: 9.3 MG/DL (ref 8.6–10.5)
CHLORIDE SERPL-SCNC: 103 MMOL/L (ref 98–107)
CHOLEST SERPL-MCNC: 112 MG/DL (ref 0–200)
CO2 SERPL-SCNC: 22.3 MMOL/L (ref 22–29)
CREAT BLD-MCNC: 0.85 MG/DL (ref 0.76–1.27)
DEPRECATED RDW RBC AUTO: 43.8 FL (ref 37–54)
EOSINOPHIL # BLD AUTO: 0.18 10*3/MM3 (ref 0–0.4)
EOSINOPHIL NFR BLD AUTO: 3.3 % (ref 0.3–6.2)
ERYTHROCYTE [DISTWIDTH] IN BLOOD BY AUTOMATED COUNT: 15.9 % (ref 12.3–15.4)
GFR SERPL CREATININE-BSD FRML MDRD: 87 ML/MIN/1.73
GLOBULIN UR ELPH-MCNC: 3.8 GM/DL
GLUCOSE BLD-MCNC: 87 MG/DL (ref 65–99)
HCT VFR BLD AUTO: 35.1 % (ref 37.5–51)
HDLC SERPL-MCNC: 31 MG/DL (ref 40–60)
HGB BLD-MCNC: 11.3 G/DL (ref 13–17.7)
IMM GRANULOCYTES # BLD AUTO: 0.02 10*3/MM3 (ref 0–0.05)
IMM GRANULOCYTES NFR BLD AUTO: 0.4 % (ref 0–0.5)
LDLC SERPL CALC-MCNC: 56 MG/DL (ref 0–100)
LDLC/HDLC SERPL: 1.81 {RATIO}
LYMPHOCYTES # BLD AUTO: 1.59 10*3/MM3 (ref 0.7–3.1)
LYMPHOCYTES NFR BLD AUTO: 28.8 % (ref 19.6–45.3)
MCH RBC QN AUTO: 24.7 PG (ref 26.6–33)
MCHC RBC AUTO-ENTMCNC: 32.2 G/DL (ref 31.5–35.7)
MCV RBC AUTO: 76.8 FL (ref 79–97)
MONOCYTES # BLD AUTO: 0.49 10*3/MM3 (ref 0.1–0.9)
MONOCYTES NFR BLD AUTO: 8.9 % (ref 5–12)
NEUTROPHILS # BLD AUTO: 3.2 10*3/MM3 (ref 1.7–7)
NEUTROPHILS NFR BLD AUTO: 57.9 % (ref 42.7–76)
NRBC BLD AUTO-RTO: 0 /100 WBC (ref 0–0.2)
PLATELET # BLD AUTO: 225 10*3/MM3 (ref 140–450)
PMV BLD AUTO: 12.5 FL (ref 6–12)
POTASSIUM BLD-SCNC: 4 MMOL/L (ref 3.5–5.2)
PROT SERPL-MCNC: 8.2 G/DL (ref 6–8.5)
RBC # BLD AUTO: 4.57 10*6/MM3 (ref 4.14–5.8)
SODIUM BLD-SCNC: 139 MMOL/L (ref 136–145)
TRIGL SERPL-MCNC: 124 MG/DL (ref 0–150)
TSH SERPL DL<=0.05 MIU/L-ACNC: 1.8 UIU/ML (ref 0.27–4.2)
VLDLC SERPL-MCNC: 24.8 MG/DL (ref 5–40)
WBC NRBC COR # BLD: 5.52 10*3/MM3 (ref 3.4–10.8)

## 2019-12-16 ENCOUNTER — LAB (OUTPATIENT)
Dept: LAB | Facility: HOSPITAL | Age: 79
End: 2019-12-16

## 2019-12-16 DIAGNOSIS — Z00.00 ROUTINE MEDICAL EXAM: ICD-10-CM

## 2019-12-16 LAB
BILIRUB UR QL STRIP: NEGATIVE
CLARITY UR: CLEAR
COLOR UR: YELLOW
GLUCOSE UR STRIP-MCNC: NEGATIVE MG/DL
HGB UR QL STRIP.AUTO: NEGATIVE
KETONES UR QL STRIP: NEGATIVE
LEUKOCYTE ESTERASE UR QL STRIP.AUTO: NEGATIVE
NITRITE UR QL STRIP: NEGATIVE
PH UR STRIP.AUTO: 6.5 [PH] (ref 5–8)
PROT UR QL STRIP: NEGATIVE
SP GR UR STRIP: 1.01 (ref 1–1.03)
UROBILINOGEN UR QL STRIP: NORMAL

## 2019-12-16 PROCEDURE — 81003 URINALYSIS AUTO W/O SCOPE: CPT

## 2019-12-17 ENCOUNTER — TELEPHONE (OUTPATIENT)
Dept: FAMILY MEDICINE CLINIC | Facility: CLINIC | Age: 79
End: 2019-12-17

## 2019-12-17 NOTE — TELEPHONE ENCOUNTER
----- Message from Bryce Collazo MD sent at 12/15/2019  9:43 AM CST -----  Has mild anemia, otherwise labs good will go over at next visit.

## 2019-12-20 ENCOUNTER — OFFICE VISIT (OUTPATIENT)
Dept: FAMILY MEDICINE CLINIC | Facility: CLINIC | Age: 79
End: 2019-12-20

## 2019-12-20 VITALS
BODY MASS INDEX: 27.47 KG/M2 | HEIGHT: 70 IN | HEART RATE: 67 BPM | DIASTOLIC BLOOD PRESSURE: 60 MMHG | OXYGEN SATURATION: 97 % | WEIGHT: 191.9 LBS | TEMPERATURE: 98.1 F | SYSTOLIC BLOOD PRESSURE: 134 MMHG

## 2019-12-20 DIAGNOSIS — E03.9 HYPOTHYROIDISM, UNSPECIFIED TYPE: ICD-10-CM

## 2019-12-20 DIAGNOSIS — F33.41 RECURRENT MAJOR DEPRESSIVE DISORDER, IN PARTIAL REMISSION (HCC): ICD-10-CM

## 2019-12-20 DIAGNOSIS — F03.90 DEMENTIA WITHOUT BEHAVIORAL DISTURBANCE, UNSPECIFIED DEMENTIA TYPE: ICD-10-CM

## 2019-12-20 DIAGNOSIS — Z79.01 LONG TERM (CURRENT) USE OF ANTICOAGULANTS: ICD-10-CM

## 2019-12-20 DIAGNOSIS — Z00.00 ROUTINE MEDICAL EXAM: ICD-10-CM

## 2019-12-20 DIAGNOSIS — I48.11 LONGSTANDING PERSISTENT ATRIAL FIBRILLATION (HCC): ICD-10-CM

## 2019-12-20 DIAGNOSIS — J42 CHRONIC BRONCHITIS, UNSPECIFIED CHRONIC BRONCHITIS TYPE (HCC): ICD-10-CM

## 2019-12-20 DIAGNOSIS — K21.9 GERD WITHOUT ESOPHAGITIS: ICD-10-CM

## 2019-12-20 PROCEDURE — 99214 OFFICE O/P EST MOD 30 MIN: CPT | Performed by: FAMILY MEDICINE

## 2020-01-01 ENCOUNTER — OFFICE VISIT (OUTPATIENT)
Dept: FAMILY MEDICINE CLINIC | Facility: CLINIC | Age: 80
End: 2020-01-01

## 2020-01-01 ENCOUNTER — HOSPITAL ENCOUNTER (EMERGENCY)
Facility: HOSPITAL | Age: 80
Discharge: LEFT BEFORE DISCHARGE | End: 2020-12-28

## 2020-01-01 ENCOUNTER — PROCEDURE VISIT (OUTPATIENT)
Dept: FAMILY MEDICINE CLINIC | Facility: CLINIC | Age: 80
End: 2020-01-01

## 2020-01-01 ENCOUNTER — NURSE TRIAGE (OUTPATIENT)
Dept: CALL CENTER | Facility: HOSPITAL | Age: 80
End: 2020-01-01

## 2020-01-01 VITALS
WEIGHT: 188.5 LBS | HEIGHT: 70 IN | HEART RATE: 76 BPM | DIASTOLIC BLOOD PRESSURE: 60 MMHG | BODY MASS INDEX: 26.99 KG/M2 | SYSTOLIC BLOOD PRESSURE: 136 MMHG | OXYGEN SATURATION: 94 % | TEMPERATURE: 97.5 F

## 2020-01-01 VITALS
OXYGEN SATURATION: 94 % | WEIGHT: 190 LBS | HEIGHT: 69 IN | SYSTOLIC BLOOD PRESSURE: 146 MMHG | DIASTOLIC BLOOD PRESSURE: 81 MMHG | TEMPERATURE: 97.9 F | HEART RATE: 82 BPM | BODY MASS INDEX: 28.14 KG/M2 | RESPIRATION RATE: 24 BRPM

## 2020-01-01 VITALS
SYSTOLIC BLOOD PRESSURE: 144 MMHG | TEMPERATURE: 97.1 F | WEIGHT: 189 LBS | BODY MASS INDEX: 27.06 KG/M2 | OXYGEN SATURATION: 97 % | HEART RATE: 71 BPM | HEIGHT: 70 IN | DIASTOLIC BLOOD PRESSURE: 60 MMHG

## 2020-01-01 VITALS
SYSTOLIC BLOOD PRESSURE: 134 MMHG | WEIGHT: 186 LBS | TEMPERATURE: 98.2 F | DIASTOLIC BLOOD PRESSURE: 60 MMHG | OXYGEN SATURATION: 97 % | HEIGHT: 70 IN | BODY MASS INDEX: 26.63 KG/M2 | HEART RATE: 67 BPM

## 2020-01-01 DIAGNOSIS — L57.0 KERATOSIS: ICD-10-CM

## 2020-01-01 DIAGNOSIS — F03.90 DEMENTIA WITHOUT BEHAVIORAL DISTURBANCE, UNSPECIFIED DEMENTIA TYPE: ICD-10-CM

## 2020-01-01 DIAGNOSIS — J42 CHRONIC BRONCHITIS, UNSPECIFIED CHRONIC BRONCHITIS TYPE (HCC): ICD-10-CM

## 2020-01-01 DIAGNOSIS — L57.0 ACTINIC KERATOSIS: ICD-10-CM

## 2020-01-01 DIAGNOSIS — Z87.01 H/O: PNEUMONIA: ICD-10-CM

## 2020-01-01 DIAGNOSIS — Z91.81 AT HIGH RISK FOR INJURY RELATED TO FALL: ICD-10-CM

## 2020-01-01 DIAGNOSIS — R53.81 PHYSICAL DECONDITIONING: ICD-10-CM

## 2020-01-01 DIAGNOSIS — Z23 NEED FOR IMMUNIZATION AGAINST INFLUENZA: ICD-10-CM

## 2020-01-01 DIAGNOSIS — Z00.00 MEDICARE ANNUAL WELLNESS VISIT, SUBSEQUENT: Primary | ICD-10-CM

## 2020-01-01 DIAGNOSIS — Z86.73 HISTORY OF STROKE: ICD-10-CM

## 2020-01-01 DIAGNOSIS — R42 DIZZINESS: ICD-10-CM

## 2020-01-01 DIAGNOSIS — R26.81 UNSTEADY GAIT: ICD-10-CM

## 2020-01-01 PROCEDURE — G0008 ADMIN INFLUENZA VIRUS VAC: HCPCS | Performed by: FAMILY MEDICINE

## 2020-01-01 PROCEDURE — 99211 OFF/OP EST MAY X REQ PHY/QHP: CPT

## 2020-01-01 PROCEDURE — 99214 OFFICE O/P EST MOD 30 MIN: CPT | Performed by: FAMILY MEDICINE

## 2020-01-01 PROCEDURE — G0439 PPPS, SUBSEQ VISIT: HCPCS | Performed by: FAMILY MEDICINE

## 2020-01-01 PROCEDURE — 99213 OFFICE O/P EST LOW 20 MIN: CPT | Performed by: FAMILY MEDICINE

## 2020-01-01 PROCEDURE — 96160 PT-FOCUSED HLTH RISK ASSMT: CPT | Performed by: FAMILY MEDICINE

## 2020-01-01 PROCEDURE — 90694 VACC AIIV4 NO PRSRV 0.5ML IM: CPT | Performed by: FAMILY MEDICINE

## 2020-01-01 RX ORDER — METOPROLOL SUCCINATE 25 MG/1
1 TABLET, EXTENDED RELEASE ORAL DAILY
COMMUNITY
Start: 2020-01-01 | End: 2021-01-01 | Stop reason: SDUPTHER

## 2020-01-01 RX ORDER — SERTRALINE HYDROCHLORIDE 25 MG/1
TABLET, FILM COATED ORAL
Qty: 90 TABLET | Refills: 1 | Status: SHIPPED | OUTPATIENT
Start: 2020-01-01 | End: 2021-01-01 | Stop reason: SDUPTHER

## 2020-01-01 RX ORDER — CLOPIDOGREL BISULFATE 75 MG/1
75 TABLET ORAL DAILY
COMMUNITY
Start: 2020-09-24 | End: 2021-01-01 | Stop reason: ALTCHOICE

## 2020-01-01 RX ORDER — PANTOPRAZOLE SODIUM 40 MG/1
TABLET, DELAYED RELEASE ORAL
Qty: 90 TABLET | Refills: 1 | Status: SHIPPED | OUTPATIENT
Start: 2020-01-01 | End: 2021-01-01 | Stop reason: SDUPTHER

## 2020-01-01 RX ORDER — AMMONIUM LACTATE 12 G/100G
CREAM TOPICAL AS NEEDED
Qty: 1 EACH | Refills: 3 | Status: SHIPPED | OUTPATIENT
Start: 2020-01-01 | End: 2021-01-01 | Stop reason: HOSPADM

## 2020-01-01 RX ORDER — LEVOTHYROXINE SODIUM 0.07 MG/1
TABLET ORAL
Qty: 90 TABLET | Refills: 1 | Status: SHIPPED | OUTPATIENT
Start: 2020-01-01 | End: 2021-01-01 | Stop reason: SDUPTHER

## 2020-01-01 RX ORDER — AMMONIUM LACTATE 12 G/100G
CREAM TOPICAL AS NEEDED
Qty: 1 EACH | Refills: 3 | Status: SHIPPED | OUTPATIENT
Start: 2020-01-01 | End: 2020-01-01 | Stop reason: SDUPTHER

## 2020-01-03 NOTE — PATIENT INSTRUCTIONS
Health Maintenance After Age 65  After age 65, you are at a higher risk for certain long-term diseases and infections as well as injuries from falls. Falls are a major cause of broken bones and head injuries in people who are older than age 65. Getting regular preventive care can help to keep you healthy and well. Preventive care includes getting regular testing and making lifestyle changes as recommended by your health care provider. Talk with your health care provider about:  · Which screenings and tests you should have. A screening is a test that checks for a disease when you have no symptoms.  · A diet and exercise plan that is right for you.  What should I know about screenings and tests to prevent falls?  Screening and testing are the best ways to find a health problem early. Early diagnosis and treatment give you the best chance of managing medical conditions that are common after age 65. Certain conditions and lifestyle choices may make you more likely to have a fall. Your health care provider may recommend:  · Regular vision checks. Poor vision and conditions such as cataracts can make you more likely to have a fall. If you wear glasses, make sure to get your prescription updated if your vision changes.  · Medicine review. Work with your health care provider to regularly review all of the medicines you are taking, including over-the-counter medicines. Ask your health care provider about any side effects that may make you more likely to have a fall. Tell your health care provider if any medicines that you take make you feel dizzy or sleepy.  · Osteoporosis screening. Osteoporosis is a condition that causes the bones to get weaker. This can make the bones weak and cause them to break more easily.  · Blood pressure screening. Blood pressure changes and medicines to control blood pressure can make you feel dizzy.  · Strength and balance checks. Your health care provider may recommend certain tests to check your  strength and balance while standing, walking, or changing positions.  · Foot health exam. Foot pain and numbness, as well as not wearing proper footwear, can make you more likely to have a fall.  · Depression screening. You may be more likely to have a fall if you have a fear of falling, feel emotionally low, or feel unable to do activities that you used to do.  · Alcohol use screening. Using too much alcohol can affect your balance and may make you more likely to have a fall.  What actions can I take to lower my risk of falls?  General instructions  · Talk with your health care provider about your risks for falling. Tell your health care provider if:  ? You fall. Be sure to tell your health care provider about all falls, even ones that seem minor.  ? You feel dizzy, sleepy, or off-balance.  · Take over-the-counter and prescription medicines only as told by your health care provider. These include any supplements.  · Eat a healthy diet and maintain a healthy weight. A healthy diet includes low-fat dairy products, low-fat (lean) meats, and fiber from whole grains, beans, and lots of fruits and vegetables.  Home safety  · Remove any tripping hazards, such as rugs, cords, and clutter.  · Install safety equipment such as grab bars in bathrooms and safety rails on stairs.  · Keep rooms and walkways well-lit.  Activity    · Follow a regular exercise program to stay fit. This will help you maintain your balance. Ask your health care provider what types of exercise are appropriate for you.  · If you need a cane or walker, use it as recommended by your health care provider.  · Wear supportive shoes that have nonskid soles.  Lifestyle  · Do not drink alcohol if your health care provider tells you not to drink.  · If you drink alcohol, limit how much you have:  ? 0-1 drink a day for women.  ? 0-2 drinks a day for men.  · Be aware of how much alcohol is in your drink. In the [place] one drink equals one typical bottle of beer (12  oz), one-half glass of wine (5 oz), or one shot of hard liquor (1½ oz).  · Do not use any products that contain nicotine or tobacco, such as cigarettes and e-cigarettes. If you need help quitting, ask your health care provider.  Summary  · Having a healthy lifestyle and getting preventive care can help to protect your health and wellness after age 65.  · Screening and testing are the best way to find a health problem early and help you avoid having a fall. Early diagnosis and treatment give you the best chance for managing medical conditions that are more common for people who are older than age 65.  · Falls are a major cause of broken bones and head injuries in people who are older than age 65. Take precautions to prevent a fall at home.  · Work with your health care provider to learn what changes you can make to improve your health and wellness and to prevent falls.  This information is not intended to replace advice given to you by your health care provider. Make sure you discuss any questions you have with your health care provider.  Document Released: 10/31/2018 Document Revised: 10/31/2018 Document Reviewed: 10/31/2018  [employer] Interactive Patient Education © 2019 [employer]      Stroke Prevention  Some medical conditions and lifestyle choices can lead to a higher risk for a stroke. You can help to prevent a stroke by making nutrition, lifestyle, and other changes.  What nutrition changes can be made?    · Eat healthy foods.  ? Choose foods that are high in fiber. These include:  § Fresh fruits.  § Fresh vegetables.  § Whole grains.  ? Eat at least 5 or more servings of fruits and vegetables each day. Try to fill half of your plate at each meal with fruits and vegetables.  ? Choose lean protein foods. These include:  § Lowfat (lean) cuts of meat.  § Chicken without skin.  § Fish.  § Tofu.  § Beans.  § Nuts.  ? Eat low-fat dairy products.  ? Avoid foods that:  § Are high in salt (sodium).  § Have saturated  fat.  § Have trans fat.  § Have cholesterol.  § Are processed.  § Are premade.  · Follow eating guidelines as told by your doctor. These may include:  ? Reducing how many calories you eat and drink each day.  ? Limiting how much salt you eat or drink each day to 1,500 milligrams (mg).  ? Using only healthy fats for cooking. These include:  § Olive oil.  § Canola oil.  § Sunflower oil.  ? Counting how many carbohydrates you eat and drink each day.  What lifestyle changes can be made?  · Try to stay at a healthy weight. Talk to your doctor about what a good weight is for you.  · Get at least 30 minutes of moderate physical activity at least 5 days a week. This can include:  ? Fast walking.  ? Biking.  ? Swimming.  · Do not use any products that have nicotine or tobacco. This includes cigarettes and e-cigarettes. If you need help quitting, ask your doctor. Avoid being around tobacco smoke in general.  · Limit how much alcohol you drink to no more than 1 drink a day for nonpregnant women and 2 drinks a day for men. One drink equals 12 oz of beer, 5 oz of wine, or 1½ oz of hard liquor.  · Do not use drugs.  · Avoid taking birth control pills. Talk to your doctor about the risks of taking birth control pills if:  ? You are over 35 years old.  ? You smoke.  ? You get migraines.  ? You have had a blood clot.  What other changes can be made?  · Manage your cholesterol.  ? It is important to eat a healthy diet.  ? If your cholesterol cannot be managed through your diet, you may also need to take medicines. Take medicines as told by your doctor.  · Manage your diabetes.  ? It is important to eat a healthy diet and to exercise regularly.  ? If your blood sugar cannot be managed through diet and exercise, you may need to take medicines. Take medicines as told by your doctor.  · Control your high blood pressure (hypertension).  ? Try to keep your blood pressure below 130/80. This can help lower your risk of stroke.  ? It is  "important to eat a healthy diet and to exercise regularly.  ? If your blood pressure cannot be managed through diet and exercise, you may need to take medicines. Take medicines as told by your doctor.  ? Ask your doctor if you should check your blood pressure at home.  ? Have your blood pressure checked every year. Do this even if your blood pressure is normal.  · Talk to your doctor about getting checked for a sleep disorder. Signs of this can include:  ? Snoring a lot.  ? Feeling very tired.  · Take over-the-counter and prescription medicines only as told by your doctor. These may include aspirin or blood thinners (antiplatelets or anticoagulants).  · Make sure that any other medical conditions you have are managed.  Where to find more information  · American Stroke Association: www.strokeassociation.org  · National Stroke Association: www.stroke.org  Get help right away if:  · You have any symptoms of stroke. \"BE FAST\" is an easy way to remember the main warning signs:  ? B - Balance. Signs are dizziness, sudden trouble walking, or loss of balance.  ? E - Eyes. Signs are trouble seeing or a sudden change in how you see.  ? F - Face. Signs are sudden weakness or loss of feeling of the face, or the face or eyelid drooping on one side.  ? A - Arms. Signs are weakness or loss of feeling in an arm. This happens suddenly and usually on one side of the body.  ? S - Speech. Signs are sudden trouble speaking, slurred speech, or trouble understanding what people say.  ? T - Time. Time to call emergency services. Write down what time symptoms started.  · You have other signs of stroke, such as:  ? A sudden, very bad headache with no known cause.  ? Feeling sick to your stomach (nausea).  ? Throwing up (vomiting).  ? Jerky movements you cannot control (seizure).  These symptoms may represent a serious problem that is an emergency. Do not wait to see if the symptoms will go away. Get medical help right away. Call your local " emergency services (911 in the [place] Do not drive yourself to the hospital.  Summary  · You can prevent a stroke by eating healthy, exercising, not smoking, drinking less alcohol, and treating other health problems, such as diabetes, high blood pressure, or high cholesterol.  · Do not use any products that contain nicotine or tobacco, such as cigarettes and e-cigarettes.  · Get help right away if you have any signs or symptoms of a stroke.  This information is not intended to replace advice given to you by your health care provider. Make sure you discuss any questions you have with your health care provider.  Document Released: 06/18/2013 Document Revised: 03/21/2018 Document Reviewed: 03/21/2018  [employer] Interactive Patient Education © 2019 [employer]

## 2020-02-19 ENCOUNTER — TELEPHONE (OUTPATIENT)
Dept: FAMILY MEDICINE CLINIC | Facility: CLINIC | Age: 80
End: 2020-02-19

## 2020-02-19 NOTE — TELEPHONE ENCOUNTER
Patient's wife Katarina called and requested an appointment for tomorrow with Dr. Collazo. She said that he has been to the bathroom several times more than normal today and that he has been having urinary troubles in general.    I offered a 3/2/2020 appointment but she was hoping to get him seen tomorrow, I let her know that I would put a note back to see if that was a possibility. She stated that if that were not an option that she would take him to the walk-in clinic. He really wanted to be seen by Dr. Collazo.    Katarina can be contacted on behalf of the patient at [phone] to clarify and confirm.

## 2020-03-09 ENCOUNTER — OFFICE VISIT (OUTPATIENT)
Dept: FAMILY MEDICINE CLINIC | Facility: CLINIC | Age: 80
End: 2020-03-09

## 2020-03-09 VITALS
HEART RATE: 72 BPM | SYSTOLIC BLOOD PRESSURE: 130 MMHG | DIASTOLIC BLOOD PRESSURE: 60 MMHG | TEMPERATURE: 98 F | WEIGHT: 195.9 LBS | BODY MASS INDEX: 28.05 KG/M2 | HEIGHT: 70 IN | OXYGEN SATURATION: 94 %

## 2020-03-09 DIAGNOSIS — Z86.73 HISTORY OF STROKE: ICD-10-CM

## 2020-03-09 DIAGNOSIS — Z79.01 LONG TERM (CURRENT) USE OF ANTICOAGULANTS: ICD-10-CM

## 2020-03-09 DIAGNOSIS — F03.90 DEMENTIA WITHOUT BEHAVIORAL DISTURBANCE, UNSPECIFIED DEMENTIA TYPE: ICD-10-CM

## 2020-03-09 DIAGNOSIS — J22 LOWER RESPIRATORY INFECTION (E.G., BRONCHITIS, PNEUMONIA, PNEUMONITIS, PULMONITIS): ICD-10-CM

## 2020-03-09 PROCEDURE — 99214 OFFICE O/P EST MOD 30 MIN: CPT | Performed by: FAMILY MEDICINE

## 2020-03-09 PROCEDURE — 96372 THER/PROPH/DIAG INJ SC/IM: CPT | Performed by: FAMILY MEDICINE

## 2020-03-09 RX ORDER — AZITHROMYCIN 250 MG/1
TABLET, FILM COATED ORAL
Qty: 6 TABLET | Refills: 0 | Status: SHIPPED | OUTPATIENT
Start: 2020-03-09 | End: 2020-03-09 | Stop reason: SDUPTHER

## 2020-03-09 RX ORDER — PANTOPRAZOLE SODIUM 40 MG/1
40 TABLET, DELAYED RELEASE ORAL DAILY
Qty: 90 TABLET | Refills: 1 | Status: SHIPPED | OUTPATIENT
Start: 2020-03-09 | End: 2020-01-01

## 2020-03-09 RX ORDER — AZITHROMYCIN 250 MG/1
TABLET, FILM COATED ORAL
Qty: 6 TABLET | Refills: 0 | Status: SHIPPED | OUTPATIENT
Start: 2020-03-09 | End: 2020-08-27

## 2020-03-09 RX ORDER — BROMPHENIRAMINE MALEATE, PSEUDOEPHEDRINE HYDROCHLORIDE, AND DEXTROMETHORPHAN HYDROBROMIDE 2; 30; 10 MG/5ML; MG/5ML; MG/5ML
5 SYRUP ORAL 3 TIMES DAILY PRN
Qty: 118 ML | Refills: 1 | Status: SHIPPED | OUTPATIENT
Start: 2020-03-09 | End: 2020-08-27

## 2020-03-09 RX ORDER — SERTRALINE HYDROCHLORIDE 25 MG/1
25 TABLET, FILM COATED ORAL NIGHTLY
Qty: 90 TABLET | Refills: 1 | Status: SHIPPED | OUTPATIENT
Start: 2020-03-09 | End: 2020-01-01

## 2020-03-09 RX ORDER — TRIAMCINOLONE ACETONIDE 40 MG/ML
40 INJECTION, SUSPENSION INTRA-ARTICULAR; INTRAMUSCULAR ONCE
Status: COMPLETED | OUTPATIENT
Start: 2020-03-09 | End: 2020-03-09

## 2020-03-09 RX ORDER — CEFTRIAXONE 1 G/1
1 INJECTION, POWDER, FOR SOLUTION INTRAMUSCULAR; INTRAVENOUS ONCE
Status: COMPLETED | OUTPATIENT
Start: 2020-03-09 | End: 2020-03-09

## 2020-03-09 RX ORDER — BROMPHENIRAMINE MALEATE, PSEUDOEPHEDRINE HYDROCHLORIDE, AND DEXTROMETHORPHAN HYDROBROMIDE 2; 30; 10 MG/5ML; MG/5ML; MG/5ML
5 SYRUP ORAL 3 TIMES DAILY PRN
Qty: 118 ML | Refills: 1 | Status: SHIPPED | OUTPATIENT
Start: 2020-03-09 | End: 2020-03-09 | Stop reason: SDUPTHER

## 2020-03-09 RX ADMIN — CEFTRIAXONE 1 G: 1 INJECTION, POWDER, FOR SOLUTION INTRAMUSCULAR; INTRAVENOUS at 11:26

## 2020-03-09 RX ADMIN — TRIAMCINOLONE ACETONIDE 40 MG: 40 INJECTION, SUSPENSION INTRA-ARTICULAR; INTRAMUSCULAR at 11:27

## 2020-03-12 PROBLEM — J22 LOWER RESPIRATORY INFECTION (E.G., BRONCHITIS, PNEUMONIA, PNEUMONITIS, PULMONITIS): Status: ACTIVE | Noted: 2020-03-12

## 2020-03-12 NOTE — PATIENT INSTRUCTIONS
Health Maintenance After Age 65  After age 65, you are at a higher risk for certain long-term diseases and infections as well as injuries from falls. Falls are a major cause of broken bones and head injuries in people who are older than age 65. Getting regular preventive care can help to keep you healthy and well. Preventive care includes getting regular testing and making lifestyle changes as recommended by your health care provider. Talk with your health care provider about:  · Which screenings and tests you should have. A screening is a test that checks for a disease when you have no symptoms.  · A diet and exercise plan that is right for you.  What should I know about screenings and tests to prevent falls?  Screening and testing are the best ways to find a health problem early. Early diagnosis and treatment give you the best chance of managing medical conditions that are common after age 65. Certain conditions and lifestyle choices may make you more likely to have a fall. Your health care provider may recommend:  · Regular vision checks. Poor vision and conditions such as cataracts can make you more likely to have a fall. If you wear glasses, make sure to get your prescription updated if your vision changes.  · Medicine review. Work with your health care provider to regularly review all of the medicines you are taking, including over-the-counter medicines. Ask your health care provider about any side effects that may make you more likely to have a fall. Tell your health care provider if any medicines that you take make you feel dizzy or sleepy.  · Osteoporosis screening. Osteoporosis is a condition that causes the bones to get weaker. This can make the bones weak and cause them to break more easily.  · Blood pressure screening. Blood pressure changes and medicines to control blood pressure can make you feel dizzy.  · Strength and balance checks. Your health care provider may recommend certain tests to check your  strength and balance while standing, walking, or changing positions.  · Foot health exam. Foot pain and numbness, as well as not wearing proper footwear, can make you more likely to have a fall.  · Depression screening. You may be more likely to have a fall if you have a fear of falling, feel emotionally low, or feel unable to do activities that you used to do.  · Alcohol use screening. Using too much alcohol can affect your balance and may make you more likely to have a fall.  What actions can I take to lower my risk of falls?  General instructions  · Talk with your health care provider about your risks for falling. Tell your health care provider if:  ? You fall. Be sure to tell your health care provider about all falls, even ones that seem minor.  ? You feel dizzy, sleepy, or off-balance.  · Take over-the-counter and prescription medicines only as told by your health care provider. These include any supplements.  · Eat a healthy diet and maintain a healthy weight. A healthy diet includes low-fat dairy products, low-fat (lean) meats, and fiber from whole grains, beans, and lots of fruits and vegetables.  Home safety  · Remove any tripping hazards, such as rugs, cords, and clutter.  · Install safety equipment such as grab bars in bathrooms and safety rails on stairs.  · Keep rooms and walkways well-lit.  Activity    · Follow a regular exercise program to stay fit. This will help you maintain your balance. Ask your health care provider what types of exercise are appropriate for you.  · If you need a cane or walker, use it as recommended by your health care provider.  · Wear supportive shoes that have nonskid soles.  Lifestyle  · Do not drink alcohol if your health care provider tells you not to drink.  · If you drink alcohol, limit how much you have:  ? 0-1 drink a day for women.  ? 0-2 drinks a day for men.  · Be aware of how much alcohol is in your drink. In the [place] one drink equals one typical bottle of beer (12  oz), one-half glass of wine (5 oz), or one shot of hard liquor (1½ oz).  · Do not use any products that contain nicotine or tobacco, such as cigarettes and e-cigarettes. If you need help quitting, ask your health care provider.  Summary  · Having a healthy lifestyle and getting preventive care can help to protect your health and wellness after age 65.  · Screening and testing are the best way to find a health problem early and help you avoid having a fall. Early diagnosis and treatment give you the best chance for managing medical conditions that are more common for people who are older than age 65.  · Falls are a major cause of broken bones and head injuries in people who are older than age 65. Take precautions to prevent a fall at home.  · Work with your health care provider to learn what changes you can make to improve your health and wellness and to prevent falls.  This information is not intended to replace advice given to you by your health care provider. Make sure you discuss any questions you have with your health care provider.  Document Released: 10/31/2018 Document Revised: 10/31/2018 Document Reviewed: 10/31/2018  [employer] Interactive Patient Education © 2020 [employer]      Chronic Obstructive Pulmonary Disease    Chronic obstructive pulmonary disease (COPD) is a long-term (chronic) condition that affects the lungs. COPD is a general term that can be used to describe many different lung problems that cause lung swelling (inflammation) and limit airflow, including chronic bronchitis and emphysema. If you have COPD, your lung function will probably never return to normal. In most cases, it gets worse over time. However, there are steps you can take to slow the progression of the disease and improve your quality of life.  What are the causes?  This condition may be caused by:  · Smoking. This is the most common cause.  · Certain genes passed down through families.  What increases the risk?  The  following factors may make you more likely to develop this condition:  · Secondhand smoke from cigarettes, pipes, or cigars.  · Exposure to chemicals and other irritants such as fumes and dust in the work environment.  · Chronic lung conditions or infections.  What are the signs or symptoms?  Symptoms of this condition include:  · Shortness of breath, especially during physical activity.  · Chronic cough with a large amount of thick mucus. Sometimes the cough may not have any mucus (dry cough).  · Wheezing.  · Rapid breaths.  · Gray or bluish discoloration (cyanosis) of the skin, especially in your fingers, toes, or lips.  · Feeling tired (fatigue).  · Weight loss.  · Chest tightness.  · Frequent infections.  · Episodes when breathing symptoms become much worse (exacerbations).  · Swelling in the ankles, feet, or legs. This may occur in later stages of the disease.  How is this diagnosed?  This condition is diagnosed based on:  · Your medical history.  · A physical exam.  You may also have tests, including:  · Lung (pulmonary) function tests. This may include a spirometry test, which measures your ability to exhale properly.  · Chest X-ray.  · CT scan.  · Blood tests.  How is this treated?  This condition may be treated with:  · Medicines. These may include inhaled rescue medicines to treat acute exacerbations as well as long-term, or maintenance, medicines to prevent flare-ups of COPD.  ? Bronchodilators help treat COPD by dilating the airways to allow increased airflow and make your breathing more comfortable.  ? Steroids can reduce airway inflammation and help prevent exacerbations.  · Smoking cessation. If you smoke, your health care provider may ask you to quit, and may also recommend therapy or replacement products to help you quit.  · Pulmonary rehabilitation. This may involve working with a team of health care providers and specialists, such as respiratory, occupational, and physical therapists.  · Exercise  and physical activity. These are beneficial for nearly all people with COPD.  · Nutrition therapy to gain weight, if you are underweight.  · Oxygen. Supplemental oxygen therapy is only helpful if you have a low oxygen level in your blood (hypoxemia).  · Lung surgery or transplant.  · Palliative care. This is to help people with COPD feel comfortable when treatment is no longer working.  Follow these instructions at home:  Medicines  · Take over-the-counter and prescription medicines (inhaled or pills) only as told by your health care provider.  · Talk to your health care provider before taking any cough or allergy medicines. You may need to avoid certain medicines that dry out your airways.  Lifestyle  · If you are a smoker, the most important thing that you can do is to stop smoking. Do not use any products that contain nicotine or tobacco, such as cigarettes and e-cigarettes. If you need help quitting, ask your health care provider. Continuing to smoke will cause the disease to progress faster.  · Avoid exposure to things that irritate your lungs, such as smoke, chemicals, and fumes.  · Stay active, but balance activity with periods of rest. Exercise and physical activity will help you maintain your ability to do things you want to do.  · Learn and use relaxation techniques to manage stress and to control your breathing.  · Get the right amount of sleep and get quality sleep. Most adults need 7 or more hours per night.  · Eat healthy foods. Eating smaller, more frequent meals and resting before meals may help you maintain your strength.  Controlled breathing  Learn and use controlled breathing techniques as directed by your health care provider. Controlled breathing techniques include:  · Pursed lip breathing. Start by breathing in (inhaling) through your nose for 1 second. Then, purse your lips as if you were going to whistle and breathe out (exhale) through the pursed lips for 2 seconds.  · Diaphragmatic  breathing. Start by putting one hand on your abdomen just above your waist. Inhale slowly through your nose. The hand on your abdomen should move out. Then purse your lips and exhale slowly. You should be able to feel the hand on your abdomen moving in as you exhale.  Controlled coughing  Learn and use controlled coughing to clear mucus from your lungs. Controlled coughing is a series of short, progressive coughs. The steps of controlled coughing are:  1. Lean your head slightly forward.  2. Breathe in deeply using diaphragmatic breathing.  3. Try to hold your breath for 3 seconds.  4. Keep your mouth slightly open while coughing twice.  5. Spit any mucus out into a tissue.  6. Rest and repeat the steps once or twice as needed.  General instructions  · Make sure you receive all the vaccines that your health care provider recommends, especially the pneumococcal and influenza vaccines. Preventing infection and hospitalization is very important when you have COPD.  · Use oxygen therapy and pulmonary rehabilitation if directed to by your health care provider. If you require home oxygen therapy, ask your health care provider whether you should purchase a pulse oximeter to measure your oxygen level at home.  · Work with your health care provider to develop a COPD action plan. This will help you know what steps to take if your condition gets worse.  · Keep other chronic health conditions under control as told by your health care provider.  · Avoid extreme temperature and humidity changes.  · Avoid contact with people who have an illness that spreads from person to person (is contagious), such as viral infections or pneumonia.  · Keep all follow-up visits as told by your health care provider. This is important.  Contact a health care provider if:  · You are coughing up more mucus than usual.  · There is a change in the color or thickness of your mucus.  · Your breathing is more labored than usual.  · Your breathing is  faster than usual.  · You have difficulty sleeping.  · You need to use your rescue medicines or inhalers more often than expected.  · You have trouble doing routine activities such as getting dressed or walking around the house.  Get help right away if:  · You have shortness of breath while you are resting.  · You have shortness of breath that prevents you from:  ? Being able to talk.  ? Performing your usual physical activities.  · You have chest pain lasting longer than 5 minutes.  · Your skin color is more blue (cyanotic) than usual.  · You measure low oxygen saturations for longer than 5 minutes with a pulse oximeter.  · You have a fever.  · You feel too tired to breathe normally.  Summary  · Chronic obstructive pulmonary disease (COPD) is a long-term (chronic) condition that affects the lungs.  · Your lung function will probably never return to normal. In most cases, it gets worse over time. However, there are steps you can take to slow the progression of the disease and improve your quality of life.  · Treatment for COPD may include taking medicines, quitting smoking, pulmonary rehabilitation, and changes to diet and exercise. As the disease progresses, you may need oxygen therapy, a lung transplant, or palliative care.  · To help manage your condition, do not smoke, avoid exposure to things that irritate your lungs, stay up to date on all vaccines, and follow your health care provider's instructions for taking medicines.  This information is not intended to replace advice given to you by your health care provider. Make sure you discuss any questions you have with your health care provider.  Document Released: 09/27/2006 Document Revised: 06/13/2018 Document Reviewed: 01/22/2018  [employer] Interactive Patient Education © 2020 [employer]

## 2020-03-23 ENCOUNTER — TELEPHONE (OUTPATIENT)
Dept: FAMILY MEDICINE CLINIC | Facility: CLINIC | Age: 80
End: 2020-03-23

## 2020-03-23 NOTE — TELEPHONE ENCOUNTER
Monitor for fever or worsening of symptoms. Keep me updated by phone or chart. Expect may be a while before high risk Pt are asked to come out.   Thanks   Bryce Collazo M.D. WEIFP

## 2020-03-23 NOTE — TELEPHONE ENCOUNTER
Pt cancelled appt for today. He is still coughing, used some dimetapp and robitussin cough and congestion.    Pt would also like to know when you would like to see him back.    Please advise.

## 2020-04-14 RX ORDER — LEVOTHYROXINE SODIUM 0.07 MG/1
TABLET ORAL
Qty: 90 TABLET | Refills: 1 | Status: SHIPPED | OUTPATIENT
Start: 2020-04-14 | End: 2020-01-01

## 2020-07-17 ENCOUNTER — TELEPHONE (OUTPATIENT)
Dept: FAMILY MEDICINE CLINIC | Facility: CLINIC | Age: 80
End: 2020-07-17

## 2020-07-17 DIAGNOSIS — R05.9 COUGHING: Primary | ICD-10-CM

## 2020-07-17 RX ORDER — LEVOFLOXACIN 750 MG/1
750 TABLET ORAL DAILY
Qty: 7 TABLET | Refills: 0 | Status: SHIPPED | OUTPATIENT
Start: 2020-07-17 | End: 2020-08-27

## 2020-07-17 RX ORDER — PREDNISONE 10 MG/1
10 TABLET ORAL SEE ADMIN INSTRUCTIONS
Qty: 17 TABLET | Refills: 0 | Status: SHIPPED | OUTPATIENT
Start: 2020-07-17 | End: 2020-08-27

## 2020-07-17 RX ORDER — GUAIFENESIN AND CODEINE PHOSPHATE 100; 10 MG/5ML; MG/5ML
5 SOLUTION ORAL 3 TIMES DAILY PRN
Qty: 180 ML | Refills: 0 | Status: SHIPPED | OUTPATIENT
Start: 2020-07-17 | End: 2020-08-27

## 2020-07-17 NOTE — TELEPHONE ENCOUNTER
This pt wife called and stated that he still has a terrible cough and congestion, could you please call her, she says if she doesn't answer her home phone you can call her cell [phone].

## 2020-07-21 RX ORDER — SIMVASTATIN 20 MG
TABLET ORAL
Qty: 90 TABLET | Refills: 1 | Status: SHIPPED | OUTPATIENT
Start: 2020-07-21 | End: 2021-01-01

## 2020-08-27 ENCOUNTER — OFFICE VISIT (OUTPATIENT)
Dept: FAMILY MEDICINE CLINIC | Facility: CLINIC | Age: 80
End: 2020-08-27

## 2020-08-27 VITALS
WEIGHT: 188.4 LBS | BODY MASS INDEX: 26.97 KG/M2 | DIASTOLIC BLOOD PRESSURE: 68 MMHG | SYSTOLIC BLOOD PRESSURE: 132 MMHG | OXYGEN SATURATION: 96 % | HEART RATE: 82 BPM | HEIGHT: 70 IN | TEMPERATURE: 98.4 F

## 2020-08-27 DIAGNOSIS — Z00.00 ROUTINE CHECK-UP: Primary | ICD-10-CM

## 2020-08-27 DIAGNOSIS — Z79.01 LONG TERM (CURRENT) USE OF ANTICOAGULANTS: ICD-10-CM

## 2020-08-27 DIAGNOSIS — I48.11 LONGSTANDING PERSISTENT ATRIAL FIBRILLATION (HCC): ICD-10-CM

## 2020-08-27 DIAGNOSIS — Z86.73 HISTORY OF STROKE: ICD-10-CM

## 2020-08-27 DIAGNOSIS — F03.90 DEMENTIA WITHOUT BEHAVIORAL DISTURBANCE, UNSPECIFIED DEMENTIA TYPE: ICD-10-CM

## 2020-08-27 DIAGNOSIS — K21.9 GERD WITHOUT ESOPHAGITIS: ICD-10-CM

## 2020-08-27 DIAGNOSIS — E03.9 HYPOTHYROIDISM, UNSPECIFIED TYPE: ICD-10-CM

## 2020-08-27 DIAGNOSIS — J42 CHRONIC BRONCHITIS, UNSPECIFIED CHRONIC BRONCHITIS TYPE (HCC): ICD-10-CM

## 2020-08-27 PROCEDURE — 99214 OFFICE O/P EST MOD 30 MIN: CPT | Performed by: FAMILY MEDICINE

## 2020-08-27 NOTE — PROGRESS NOTES
Subjective    Luiz Elise is a 79 y.o. white male smoker, currently down to 1/2 pk/day, COPD, HTN, H/O CAD/MI with 3 stents, A-fib, H/O CVA 2 yrs ago (L sided weakness, impaired swallow), PVD, HTN, GERD, L exotropia( Amblyopia). Recent R Retinopathy receiving eye injections. Hypothyroidism. Chronic anemia, Dementia, and other problems, see PMH/PSH . Pt presents for exam, to discuss health problems, Tx and F/U plans.  Per Wife  'S/P placement of Watchmen device for A-fib. Still very tired most of time. Had carotids checked have 80-90 stenosis, not sure if they may do surgery. B/P has been Ok. Heart burn has been controlled.'    Heart Problem   This is a chronic (CAD/MI, Stents, A-Fib) problem. The current episode started more than 1 year ago. The problem occurs daily. The problem has been waxing and waning. Associated symptoms include arthralgias, chest pain, fatigue and myalgias. Pertinent negatives include no chills, congestion, coughing, diaphoresis, joint swelling, neck pain, rash, sore throat or weakness. The symptoms are aggravated by smoking. Treatments tried: Meds, Stenting. The treatment provided moderate relief.   Breathing Problem   He complains of difficulty breathing, frequent throat clearing, hoarse voice, shortness of breath and sputum production. There is no cough or wheezing. Primary symptoms comments: COPD. This is a chronic (Acute LRI, exacerbation of COPD) problem. The current episode started more than 1 year ago. The problem occurs daily. The problem has been gradually worsening. The cough is productive of sputum. Associated symptoms include chest pain, myalgias and postnasal drip. Pertinent negatives include no appetite change, ear pain, rhinorrhea, sneezing, sore throat or trouble swallowing. His symptoms are aggravated by exposure to smoke. His symptoms are alleviated by beta-agonist and ipratropium. He reports moderate improvement on treatment. Risk factors for lung disease include  smoking/tobacco exposure. His past medical history is significant for COPD.   Hypothyroidism   This is a chronic problem. The current episode started more than 1 year ago. The problem occurs daily. The problem has been waxing and waning. Associated symptoms include arthralgias, chest pain, fatigue and myalgias. Pertinent negatives include no chills, congestion, coughing, diaphoresis, joint swelling, neck pain, rash, sore throat or weakness. Treatments tried: Levothyroxine. The treatment provided moderate relief.   Hypertension   This is a chronic problem. The current episode started more than 1 year ago. The problem has been waxing and waning since onset. The problem is controlled. Associated symptoms include chest pain, palpitations and shortness of breath. Pertinent negatives include no neck pain. Agents associated with hypertension include thyroid hormones. Risk factors for coronary artery disease include male gender and smoking/tobacco exposure. Past treatments include beta blockers. Current antihypertension treatment includes beta blockers. The current treatment provides significant improvement. Hypertensive end-organ damage includes CVA.   Anemia   Presents for initial visit. Symptoms include bruises/bleeds easily, confusion and palpitations. There has been no light-headedness or pallor. Past treatments include nothing. Past medical history includes hypothyroidism.   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 This is a chronic problem. The problem occurs daily. The problem has been unchanged. Pertinent negatives include no eye discharge, eye redness, itching, photophobia or weakness. Treatments tried: Injections. The treatment provided mild relief.   Memory Loss   This is a chronic (Dementia per wife x 5-7 yrs.) problem. The current episode started more than 1 year ago. The problem occurs daily. The problem has been gradually worsening. Associated symptoms include arthralgias,  chest pain, fatigue and myalgias. Pertinent negatives include no chills, congestion, coughing, diaphoresis, joint swelling, neck pain, rash, sore throat or weakness. The symptoms are aggravated by smoking (CVA, CAD). Treatments tried: Meds, Risk reduction. The treatment provided no relief.   Cerebrovascular Accident   Chronicity: L sided weakness, impaired swallow. The current episode started more than 1 year ago (2 yrs ago). The problem occurs daily. The problem has been gradually improving. Associated symptoms include arthralgias, chest pain, fatigue and myalgias. Pertinent negatives include no chills, congestion, coughing, diaphoresis, joint swelling, neck pain, rash, sore throat or weakness. Treatments tried: PT/ OT. The treatment provided significant relief.   GI Problem   The primary symptoms include fatigue, myalgias and arthralgias. Primary symptoms do not include diarrhea, dysuria or rash. Primary symptoms comment: Dysphagia S/P CVA. Gastrostomy tube..   The myalgias are not associated with weakness.   The illness is also significant for back pain. The illness does not include chills or constipation.        The following portions of the patient's history were reviewed and updated as appropriate: allergies, current medications, past family history, past medical history, past social history, past surgical history and problem list.    Review of Systems   Constitutional: Positive for fatigue. Negative for appetite change, chills and diaphoresis.   HENT: Positive for hoarse voice and postnasal drip. Negative for congestion, ear pain, rhinorrhea, sneezing, sore throat and trouble swallowing.    Eyes: Negative for photophobia, discharge, redness and itching.   Respiratory: Positive for sputum production and shortness of breath. Negative for cough and wheezing.    Cardiovascular: Positive for chest pain and palpitations.   Gastrointestinal: Negative for constipation and diarrhea.   Genitourinary: Negative for  dysuria, frequency, penile pain and urgency.   Musculoskeletal: Positive for arthralgias, back pain and myalgias. Negative for joint swelling and neck pain.   Skin: Negative for pallor and rash.   Neurological: Positive for dizziness. Negative for weakness and light-headedness.   Hematological: Bruises/bleeds easily.   Psychiatric/Behavioral: Positive for confusion.       Objective   Physical Exam   Constitutional: He is oriented to person, place, and time. He appears well-developed and well-nourished. No distress.   HENT:   Head: Normocephalic.   Right Ear: External ear normal.   Left Ear: External ear normal.   Nose: Nose normal.   Mouth/Throat: Oropharynx is clear and moist. No oropharyngeal exudate.   Eyes: Conjunctivae are normal. Right eye exhibits no discharge. Left eye exhibits no discharge. No scleral icterus.   Blind out turning L eye.     R eye MI PRL.    Neck: No JVD present. No tracheal deviation present. No thyromegaly present.   Cardiovascular: Normal rate and normal heart sounds. Exam reveals no gallop and no friction rub.   No murmur heard.  S/P placement Watchmen device.   Pulmonary/Chest: Effort normal. No stridor. No respiratory distress. He has no wheezes. He has no rales. He exhibits no tenderness.   Coarse BBS   Productive cough tan secretions.   Abdominal: Bowel sounds are normal. He exhibits no distension and no mass. There is no tenderness. There is no rebound and no guarding. A hernia is present.   Has 50 mm reducible umbilical hernia.    Musculoskeletal: Normal range of motion. He exhibits no edema, tenderness or deformity.   Lymphadenopathy:     He has no cervical adenopathy.   Neurological: He is alert and oriented to person, place, and time. He has normal reflexes. He displays normal reflexes. A cranial nerve deficit and sensory deficit is present. He exhibits normal muscle tone. Coordination normal.   L arm strenght 4/5.      Skin: Skin is warm and dry. Capillary refill takes 2 to 3  seconds. No rash noted. He is not diaphoretic. No erythema. No pallor.   Psychiatric: His behavior is normal.   Dementia   Nursing note and vitals reviewed.      Assessment/Plan   Luiz was seen today for heartburn, copd, hypothyroidism, dizziness and depression.    Diagnoses and all orders for this visit:    Routine check-up  -     CBC & Differential; Future  -     Comprehensive metabolic panel; Future  -     TSH; Future  -     Lipid Panel; Future  -     Urinalysis With Culture If Indicated -; Future    Longstanding persistent atrial fibrillation (CMS/HCC)    Hypothyroidism, unspecified type    Long term (current) use of anticoagulants    History of stroke    GERD without esophagitis    Dementia without behavioral disturbance, unspecified dementia type (CMS/HCC)    Chronic bronchitis, unspecified chronic bronchitis type (CMS/HCC)      Discussed exam, recent placement of Watchmen device, tx plan, rationale. Discussed F/U with specialist. Discussed F/u here.        This document has been electronically signed by Bryce Collazo MD

## 2020-09-09 ENCOUNTER — TELEPHONE (OUTPATIENT)
Dept: FAMILY MEDICINE CLINIC | Facility: CLINIC | Age: 80
End: 2020-09-09

## 2020-09-09 NOTE — TELEPHONE ENCOUNTER
[facility] IS REQUESTING A CALL BACK TO DISCUSS IF DR. JOSEPH WILL FOLLOW THIS PATIENT AND IF SO WHEN THE LAST TIME WAS THAT PATIENT WAS SEEN.

## 2020-09-09 NOTE — TELEPHONE ENCOUNTER
Pt was discharged from [facility] over the weekend. Was in for a-fib. Home health received referral on Sunday for home health, PT eval and treat.   Home health would like to know if you will follow pt?

## 2020-09-21 ENCOUNTER — TELEPHONE (OUTPATIENT)
Dept: FAMILY MEDICINE CLINIC | Facility: CLINIC | Age: 80
End: 2020-09-21

## 2020-09-21 NOTE — TELEPHONE ENCOUNTER
PATIENTS WIFE IS REQUESTING A HOSPITAL FOLLOW UP WHERE HE WAS DISCHARGED ON September 5TH   HE HAD BEEN DIAGNOSED WITH RIGHT LUNG PNEUMONIA AT [facility] AND SHE STATES IF HE IS NOT SEEN IN THE OFFICE FOR A PHYSICAL WITHIN 30 DAYS FROM DISCHARGE  THE INSURANCE WILL NOT PAY FOR HIS HOME HEALTH AND PT        REQUESTING A CALL BACK TO SCHEDULE A HOSPITAL FOLLOW UP WITH PHYSICAL     GOOD CONTACT NUMBER   [phone] (H)

## 2020-10-27 PROBLEM — F17.200 SMOKER: Status: ACTIVE | Noted: 2020-01-01

## 2020-10-27 PROBLEM — J22 LOWER RESPIRATORY INFECTION (E.G., BRONCHITIS, PNEUMONIA, PNEUMONITIS, PULMONITIS): Status: RESOLVED | Noted: 2020-03-12 | Resolved: 2020-01-01

## 2020-10-27 NOTE — PATIENT INSTRUCTIONS
Smoking Tobacco Information, Adult  Smoking tobacco can be harmful to your health. Tobacco contains a poisonous (toxic), colorless chemical called nicotine. Nicotine is addictive. It changes the brain and can make it hard to stop smoking. Tobacco also has other toxic chemicals that can hurt your body and raise your risk of many cancers.  How can smoking tobacco affect me?  Smoking tobacco puts you at risk for:  · Cancer. Smoking is most commonly associated with lung cancer, but can also lead to cancer in other parts of the body.  · Chronic obstructive pulmonary disease (COPD). This is a long-term lung condition that makes it hard to breathe. It also gets worse over time.  · High blood pressure (hypertension), heart disease, stroke, or heart attack.  · Lung infections, such as pneumonia.  · Cataracts. This is when the lenses in the eyes become clouded.  · Digestive problems. This may include peptic ulcers, heartburn, and gastroesophageal reflux disease (GERD).  · Oral health problems, such as gum disease and tooth loss.  · Loss of taste and smell.  Smoking can affect your appearance by causing:  · Wrinkles.  · Yellow or stained teeth, fingers, and fingernails.  Smoking tobacco can also affect your social life, because:  · It may be challenging to find places to smoke when away from home. Many workplaces, restaurants, hotels, and public places are tobacco-free.  · Smoking is expensive. This is due to the cost of tobacco and the long-term costs of treating health problems from smoking.  · Secondhand smoke may affect those around you. Secondhand smoke can cause lung cancer, breathing problems, and heart disease. Children of smokers have a higher risk for:  ? Sudden infant death syndrome (SIDS).  ? Ear infections.  ? Lung infections.  If you currently smoke tobacco, quitting now can help you:  · Lead a longer and healthier life.  · Look, smell, breathe, and feel better over time.  · Save money.  · Protect others from the  harms of secondhand smoke.  What actions can I take to prevent health problems?  Quit smoking    · Do not start smoking. Quit if you already do.  · Make a plan to quit smoking and commit to it. Look for programs to help you and ask your health care provider for recommendations and ideas.  · Set a date and write down all the reasons you want to quit.  · Let your friends and family know you are quitting so they can help and support you. Consider finding friends who also want to quit. It can be easier to quit with someone else, so that you can support each other.  · Talk with your health care provider about using nicotine replacement medicines to help you quit, such as gum, lozenges, patches, sprays, or pills.  · Do not replace cigarette smoking with electronic cigarettes, which are commonly called e-cigarettes. The safety of e-cigarettes is not known, and some may contain harmful chemicals.  · If you try to quit but return to smoking, stay positive. It is common to slip up when you first quit, so take it one day at a time.  · Be prepared for cravings. When you feel the urge to smoke, chew gum or suck on hard candy.  Lifestyle  · Stay busy and take care of your body.  · Drink enough fluid to keep your urine pale yellow.  · Get plenty of exercise and eat a healthy diet. This can help prevent weight gain after quitting.  · Monitor your eating habits. Quitting smoking can cause you to have a larger appetite than when you smoke.  · Find ways to relax. Go out with friends or family to a movie or a restaurant where people do not smoke.  · Ask your health care provider about having regular tests (screenings) to check for cancer. This may include blood tests, imaging tests, and other tests.  · Find ways to manage your stress, such as meditation, yoga, or exercise.  Where to find support  To get support to quit smoking, consider:  · Asking your health care provider for more information and resources.  · Taking classes to learn  more about quitting smoking.  · Looking for local organizations that offer resources about quitting smoking.  · Joining a support group for people who want to quit smoking in your local community.  · Calling the smokefree.gov counselor helpline: 1-800-Quit-Now (1-110.549.8055)  Where to find more information  You may find more information about quitting smoking from:  · HelpGuide.org: www.helpguide.org  · Smokefree.gov: smokefree.gov  · American Lung Association: www.lung.org  Contact a health care provider if you:  · Have problems breathing.  · Notice that your lips, nose, or fingers turn blue.  · Have chest pain.  · Are coughing up blood.  · Feel faint or you pass out.  · Have other health changes that cause you to worry.  Summary  · Smoking tobacco can negatively affect your health, the health of those around you, your finances, and your social life.  · Do not start smoking. Quit if you already do. If you need help quitting, ask your health care provider.  · Think about joining a support group for people who want to quit smoking in your local community. There are many effective programs that will help you to quit this behavior.  This information is not intended to replace advice given to you by your health care provider. Make sure you discuss any questions you have with your health care provider.  Document Released: 01/02/2018 Document Revised: 09/11/2020 Document Reviewed: 01/02/2018  Elsevier Patient Education © 2020 Elsevier Inc.  Fall Prevention in the Home, Adult  Falls can cause injuries and can affect people from all age groups. There are many simple things that you can do to make your home safe and to help prevent falls. Ask for help when making these changes, if needed.  What actions can I take to prevent falls?  General instructions  · Use good lighting in all rooms. Replace any light bulbs that burn out.  · Turn on lights if it is dark. Use night-lights.  · Place frequently used items in easy-to-reach  places. Lower the shelves around your home if necessary.  · Set up furniture so that there are clear paths around it. Avoid moving your furniture around.  · Remove throw rugs and other tripping hazards from the floor.  · Avoid walking on wet floors.  · Fix any uneven floor surfaces.  · Add color or contrast paint or tape to grab bars and handrails in your home. Place contrasting color strips on the first and last steps of stairways.  · When you use a stepladder, make sure that it is completely opened and that the sides are firmly locked. Have someone hold the ladder while you are using it. Do not climb a closed stepladder.  · Be aware of any and all pets.  What can I do in the bathroom?         · Keep the floor dry. Immediately clean up any water that spills onto the floor.  · Remove soap buildup in the tub or shower on a regular basis.  · Use non-skid mats or decals on the floor of the tub or shower.  · Attach bath mats securely with double-sided, non-slip rug tape.  · If you need to sit down while you are in the shower, use a plastic, non-slip stool.  · Install grab bars by the toilet and in the tub and shower. Do not use towel bars as grab bars.  What can I do in the bedroom?  · Make sure that a bedside light is easy to reach.  · Do not use oversized bedding that drapes onto the floor.  · Have a firm chair that has side arms to use for getting dressed.  What can I do in the kitchen?  · Clean up any spills right away.  · If you need to reach for something above you, use a sturdy step stool that has a grab bar.  · Keep electrical cables out of the way.  · Do not use floor polish or wax that makes floors slippery. If you must use wax, make sure that it is non-skid floor wax.  What can I do in the stairways?  · Do not leave any items on the stairs.  · Make sure that you have a light switch at the top of the stairs and the bottom of the stairs. Have them installed if you do not have them.  · Make sure that there are  handrails on both sides of the stairs. Fix handrails that are broken or loose. Make sure that handrails are as long as the stairways.  · Install non-slip stair treads on all stairs in your home.  · Avoid having throw rugs at the top or bottom of stairways, or secure the rugs with carpet tape to prevent them from moving.  · Choose a carpet design that does not hide the edge of steps on the stairway.  · Check any carpeting to make sure that it is firmly attached to the stairs. Fix any carpet that is loose or worn.  What can I do on the outside of my home?  · Use bright outdoor lighting.  · Regularly repair the edges of walkways and driveways and fix any cracks.  · Remove high doorway thresholds.  · Trim any shrubbery on the main path into your home.  · Regularly check that handrails are securely fastened and in good repair. Both sides of any steps should have handrails.  · Install guardrails along the edges of any raised decks or porches.  · Clear walkways of debris and clutter, including tools and rocks.  · Have leaves, snow, and ice cleared regularly.  · Use sand or salt on walkways during winter months.  · In the garage, clean up any spills right away, including grease or oil spills.  What other actions can I take?  · Wear closed-toe shoes that fit well and support your feet. Wear shoes that have rubber soles or low heels.  · Use mobility aids as needed, such as canes, walkers, scooters, and crutches.  · Review your medicines with your health care provider. Some medicines can cause dizziness or changes in blood pressure, which increase your risk of falling.  Talk with your health care provider about other ways that you can decrease your risk of falls. This may include working with a physical therapist or  to improve your strength, balance, and endurance.  Where to find more information  · Centers for Disease Control and PreventionALEJANDRO: https://www.cdc.gov  · [employer]:  https://ky8rgeu.zackary.nih.gov  Contact a health care provider if:  · You are afraid of falling at home.  · You feel weak, drowsy, or dizzy at home.  · You fall at home.  Summary  · There are many simple things that you can do to make your home safe and to help prevent falls.  · Ways to make your home safe include removing tripping hazards and installing grab bars in the bathroom.  · Ask for help when making these changes in your home.  This information is not intended to replace advice given to you by your health care provider. Make sure you discuss any questions you have with your health care provider.  Document Released: 2003 Document Revised: 2018 Document Reviewed: 2018  [employer] Patient Education ©  Elsevier Inc.    Medicare Wellness  Personal Prevention Plan of Service     Date of Office Visit:  10/27/2020  Encounter Provider:  Bryce Collazo MD  Place of Service[facility]  [facility]  Patient Name: Luiz Elise  :  1940    As part of the Medicare Wellness portion of your visit today, we are providing you with this personalized preventive plan of services (PPPS). This plan is based upon recommendations of the [place] Preventive Services Task Force (USPSTF) and the Advisory Committee on Immunization Practices (ACIP).    This lists the preventive care services that should be considered, and provides dates of when you are due. Items listed as completed are up-to-date and do not require any further intervention.    Health Maintenance   Topic Date Due   • COLONOSCOPY  1940   • TDAP/TD VACCINES (1 - Tdap) 10/14/1960   • ZOSTER VACCINE (1 of 2) 10/14/1991   • LUNG CANCER SCREENING  10/14/1996   • Pneumococcal Vaccine 65+ (1 of 1 - PPSV23) 10/14/2006   • HEPATITIS C SCREENING  2017   • ANNUAL WELLNESS VISIT  2017   • INFLUENZA VACCINE  Completed       No orders of the defined types were placed in this encounter.      Return  in about 1 year (around 10/27/2021) for Medicare Wellness Subsequent.        Calorie Counting for Weight Loss  Calories are units of energy. Your body needs a certain amount of calories from food to keep you going throughout the day. When you eat more calories than your body needs, your body stores the extra calories as fat. When you eat fewer calories than your body needs, your body burns fat to get the energy it needs.  Calorie counting means keeping track of how many calories you eat and drink each day. Calorie counting can be helpful if you need to lose weight. If you make sure to eat fewer calories than your body needs, you should lose weight. Ask your health care provider what a healthy weight is for you.  For calorie counting to work, you will need to eat the right number of calories in a day in order to lose a healthy amount of weight per week. A dietitian can help you determine how many calories you need in a day and will give you suggestions on how to reach your calorie goal.  · A healthy amount of weight to lose per week is usually 1-2 lb (0.5-0.9 kg). This usually means that your daily calorie intake should be reduced by 500-750 calories.  · Eating 1,200 - 1,500 calories per day can help most women lose weight.  · Eating 1,500 - 1,800 calories per day can help most men lose weight.  What is my plan?  My goal is to have __________ calories per day.  If I have this many calories per day, I should lose around __________ pounds per week.  What do I need to know about calorie counting?  In order to meet your daily calorie goal, you will need to:  · Find out how many calories are in each food you would like to eat. Try to do this before you eat.  · Decide how much of the food you plan to eat.  · Write down what you ate and how many calories it had. Doing this is called keeping a food log.  To successfully lose weight, it is important to balance calorie counting with a healthy lifestyle that includes regular  activity. Aim for 150 minutes of moderate exercise (such as walking) or 75 minutes of vigorous exercise (such as running) each week.  Where do I find calorie information?    The number of calories in a food can be found on a Nutrition Facts label. If a food does not have a Nutrition Facts label, try to look up the calories online or ask your dietitian for help.  Remember that calories are listed per serving. If you choose to have more than one serving of a food, you will have to multiply the calories per serving by the amount of servings you plan to eat. For example, the label on a package of bread might say that a serving size is 1 slice and that there are 90 calories in a serving. If you eat 1 slice, you will have eaten 90 calories. If you eat 2 slices, you will have eaten 180 calories.  How do I keep a food log?  Immediately after each meal, record the following information in your food log:  · What you ate. Don't forget to include toppings, sauces, and other extras on the food.  · How much you ate. This can be measured in cups, ounces, or number of items.  · How many calories each food and drink had.  · The total number of calories in the meal.  Keep your food log near you, such as in a small notebook in your pocket, or use a mobile laura or website. Some programs will calculate calories for you and show you how many calories you have left for the day to meet your goal.  What are some calorie counting tips?    · Use your calories on foods and drinks that will fill you up and not leave you hungry:  ? Some examples of foods that fill you up are nuts and nut butters, vegetables, lean proteins, and high-fiber foods like whole grains. High-fiber foods are foods with more than 5 g fiber per serving.  ? Drinks such as sodas, specialty coffee drinks, alcohol, and juices have a lot of calories, yet do not fill you up.  · Eat nutritious foods and avoid empty calories. Empty calories are calories you get from foods or  "beverages that do not have many vitamins or protein, such as candy, sweets, and soda. It is better to have a nutritious high-calorie food (such as an avocado) than a food with few nutrients (such as a bag of chips).  · Know how many calories are in the foods you eat most often. This will help you calculate calorie counts faster.  · Pay attention to calories in drinks. Low-calorie drinks include water and unsweetened drinks.  · Pay attention to nutrition labels for \"low fat\" or \"fat free\" foods. These foods sometimes have the same amount of calories or more calories than the full fat versions. They also often have added sugar, starch, or salt, to make up for flavor that was removed with the fat.  · Find a way of tracking calories that works for you. Get creative. Try different apps or programs if writing down calories does not work for you.  What are some portion control tips?  · Know how many calories are in a serving. This will help you know how many servings of a certain food you can have.  · Use a measuring cup to measure serving sizes. You could also try weighing out portions on a kitchen scale. With time, you will be able to estimate serving sizes for some foods.  · Take some time to put servings of different foods on your favorite plates, bowls, and cups so you know what a serving looks like.  · Try not to eat straight from a bag or box. Doing this can lead to overeating. Put the amount you would like to eat in a cup or on a plate to make sure you are eating the right portion.  · Use smaller plates, glasses, and bowls to prevent overeating.  · Try not to multitask (for example, watch TV or use your computer) while eating. If it is time to eat, sit down at a table and enjoy your food. This will help you to know when you are full. It will also help you to be aware of what you are eating and how much you are eating.  What are tips for following this plan?  Reading food labels  · Check the calorie count compared " "to the serving size. The serving size may be smaller than what you are used to eating.  · Check the source of the calories. Make sure the food you are eating is high in vitamins and protein and low in saturated and trans fats.  Shopping  · Read nutrition labels while you shop. This will help you make healthy decisions before you decide to purchase your food.  · Make a grocery list and stick to it.  Cooking  · Try to cook your favorite foods in a healthier way. For example, try baking instead of frying.  · Use low-fat dairy products.  Meal planning  · Use more fruits and vegetables. Half of your plate should be fruits and vegetables.  · Include lean proteins like poultry and fish.  How do I count calories when eating out?  · Ask for smaller portion sizes.  · Consider sharing an entree and sides instead of getting your own entree.  · If you get your own entree, eat only half. Ask for a box at the beginning of your meal and put the rest of your entree in it so you are not tempted to eat it.  · If calories are listed on the menu, choose the lower calorie options.  · Choose dishes that include vegetables, fruits, whole grains, low-fat dairy products, and lean protein.  · Choose items that are boiled, broiled, grilled, or steamed. Stay away from items that are buttered, battered, fried, or served with cream sauce. Items labeled \"crispy\" are usually fried, unless stated otherwise.  · Choose water, low-fat milk, unsweetened iced tea, or other drinks without added sugar. If you want an alcoholic beverage, choose a lower calorie option such as a glass of wine or light beer.  · Ask for dressings, sauces, and syrups on the side. These are usually high in calories, so you should limit the amount you eat.  · If you want a salad, choose a garden salad and ask for grilled meats. Avoid extra toppings like gonzalez, cheese, or fried items. Ask for the dressing on the side, or ask for olive oil and vinegar or lemon to use as " dressing.  · Estimate how many servings of a food you are given. For example, a serving of cooked rice is ½ cup or about the size of half a baseball. Knowing serving sizes will help you be aware of how much food you are eating at restaurants. The list below tells you how big or small some common portion sizes are based on everyday objects:  ? 1 oz--4 stacked dice.  ? 3 oz--1 deck of cards.  ? 1 tsp--1 die.  ? 1 Tbsp--½ a ping-pong ball.  ? 2 Tbsp--1 ping-pong ball.  ? ½ cup--½ baseball.  ? 1 cup--1 baseball.  Summary  · Calorie counting means keeping track of how many calories you eat and drink each day. If you eat fewer calories than your body needs, you should lose weight.  · A healthy amount of weight to lose per week is usually 1-2 lb (0.5-0.9 kg). This usually means reducing your daily calorie intake by 500-750 calories.  · The number of calories in a food can be found on a Nutrition Facts label. If a food does not have a Nutrition Facts label, try to look up the calories online or ask your dietitian for help.  · Use your calories on foods and drinks that will fill you up, and not on foods and drinks that will leave you hungry.  · Use smaller plates, glasses, and bowls to prevent overeating.  This information is not intended to replace advice given to you by your health care provider. Make sure you discuss any questions you have with your health care provider.  Document Released: 12/18/2006 Document Revised: 09/06/2019 Document Reviewed: 11/17/2017  [employer] Patient Education © 2020 [employer]      Exercising to Lose Weight  Exercise is structured, repetitive physical activity to improve fitness and health. Getting regular exercise is important for everyone. It is especially important if you are overweight. Being overweight increases your risk of heart disease, stroke, diabetes, high blood pressure, and several types of cancer. Reducing your calorie intake and exercising can help you lose weight.  Exercise  is usually categorized as moderate or vigorous intensity. To lose weight, most people need to do a certain amount of moderate-intensity or vigorous-intensity exercise each week.  Moderate-intensity exercise    Moderate-intensity exercise is any activity that gets you moving enough to burn at least three times more energy (calories) than if you were sitting.  Examples of moderate exercise include:  · Walking a mile in 15 minutes.  · Doing light yard work.  · Biking at an easy pace.  Most people should get at least 150 minutes (2 hours and 30 minutes) a week of moderate-intensity exercise to maintain their body weight.  Vigorous-intensity exercise  Vigorous-intensity exercise is any activity that gets you moving enough to burn at least six times more calories than if you were sitting. When you exercise at this intensity, you should be working hard enough that you are not able to carry on a conversation.  Examples of vigorous exercise include:  · Running.  · Playing a team sport, such as football, basketball, and soccer.  · Jumping rope.  Most people should get at least 75 minutes (1 hour and 15 minutes) a week of vigorous-intensity exercise to maintain their body weight.  How can exercise affect me?  When you exercise enough to burn more calories than you eat, you lose weight. Exercise also reduces body fat and builds muscle. The more muscle you have, the more calories you burn. Exercise also:  · Improves mood.  · Reduces stress and tension.  · Improves your overall fitness, flexibility, and endurance.  · Increases bone strength.  The amount of exercise you need to lose weight depends on:  · Your age.  · The type of exercise.  · Any health conditions you have.  · Your overall physical ability.  Talk to your health care provider about how much exercise you need and what types of activities are safe for you.  What actions can I take to lose weight?  Nutrition    · Make changes to your diet as told by your health care  provider or diet and nutrition specialist (dietitian). This may include:  ? Eating fewer calories.  ? Eating more protein.  ? Eating less unhealthy fats.  ? Eating a diet that includes fresh fruits and vegetables, whole grains, low-fat dairy products, and lean protein.  ? Avoiding foods with added fat, salt, and sugar.  · Drink plenty of water while you exercise to prevent dehydration or heat stroke.  Activity  · Choose an activity that you enjoy and set realistic goals. Your health care provider can help you make an exercise plan that works for you.  · Exercise at a moderate or vigorous intensity most days of the week.  ? The intensity of exercise may vary from person to person. You can tell how intense a workout is for you by paying attention to your breathing and heartbeat. Most people will notice their breathing and heartbeat get faster with more intense exercise.  · Do resistance training twice each week, such as:  ? Push-ups.  ? Sit-ups.  ? Lifting weights.  ? Using resistance bands.  · Getting short amounts of exercise can be just as helpful as long structured periods of exercise. If you have trouble finding time to exercise, try to include exercise in your daily routine.  ? Get up, stretch, and walk around every 30 minutes throughout the day.  ? Go for a walk during your lunch break.  ? Park your car farther away from your destination.  ? If you take public transportation, get off one stop early and walk the rest of the way.  ? Make phone calls while standing up and walking around.  ? Take the stairs instead of elevators or escalators.  · Wear comfortable clothes and shoes with good support.  · Do not exercise so much that you hurt yourself, feel dizzy, or get very short of breath.  Where to find more information  · [place] Department of Health and Human Services: www.hhs.gov  · Centers for Disease Control and Prevention (CDC): www.cdc.gov  Contact a health care provider:  · Before starting a new exercise  program.  · If you have questions or concerns about your weight.  · If you have a medical problem that keeps you from exercising.  Get help right away if you have any of the following while exercising:  · Injury.  · Dizziness.  · Difficulty breathing or shortness of breath that does not go away when you stop exercising.  · Chest pain.  · Rapid heartbeat.  Summary  · Being overweight increases your risk of heart disease, stroke, diabetes, high blood pressure, and several types of cancer.  · Losing weight happens when you burn more calories than you eat.  · Reducing the amount of calories you eat in addition to getting regular moderate or vigorous exercise each week helps you lose weight.  This information is not intended to replace advice given to you by your health care provider. Make sure you discuss any questions you have with your health care provider.  Document Released: 01/20/2012 Document Revised: 12/31/2018 Document Reviewed: 12/31/2018  Elsevier Patient Education © 2020 [employer]

## 2020-10-27 NOTE — PROGRESS NOTES
The ABCs of the Annual Wellness Visit  Subsequent Medicare Wellness Visit    Chief Complaint   Patient presents with   • Annual Exam     Medicare Wellness Subsequent       Subjective   History of Present Illness:  Luiz Elise is a 80 y.o. male who presents for a Subsequent Medicare Wellness Visit.    HEALTH RISK ASSESSMENT    Recent Hospitalizations:  Recently treated at the following:  Other: [facility]    Current Medical Providers:  Patient Care Team:  Bryce Collazo MD as PCP - General (Family Medicine)  Shantanu Gillespie DPM as Consulting Physician (Podiatry)  Louie Mary MD as ADT Nurse (Cardiology)    Smoking Status:  Social History     Tobacco Use   Smoking Status Current Every Day Smoker   • Packs/day: 0.50   • Years: 61.00   • Pack years: 30.50   • Types: Cigarettes   Smokeless Tobacco Never Used   Tobacco Comment    ot smokes approx 1/2-1 ppd       Alcohol Consumption:  Social History     Substance and Sexual Activity   Alcohol Use No       Depression Screen:   PHQ-2/PHQ-9 Depression Screening 10/27/2020   Little interest or pleasure in doing things 1   Feeling down, depressed, or hopeless 1   Trouble falling or staying asleep, or sleeping too much 0   Feeling tired or having little energy 3   Poor appetite or overeating 3   Feeling bad about yourself - or that you are a failure or have let yourself or your family down 0   Trouble concentrating on things, such as reading the newspaper or watching television 0   Moving or speaking so slowly that other people could have noticed. Or the opposite - being so fidgety or restless that you have been moving around a lot more than usual 0   Thoughts that you would be better off dead, or of hurting yourself in some way 0   Total Score 8   If you checked off any problems, how difficult have these problems made it for you to do your work, take care of things at home, or get along with other people? Not difficult at all       Fall Risk Screen:  ALEJANDRO  Fall Risk Assessment was completed, and patient is at HIGH risk for falls. Assessment completed on:10/27/2020    Health Habits and Functional and Cognitive Screening:  Functional & Cognitive Status 10/27/2020   Do you have difficulty preparing food and eating? No   Do you have difficulty bathing yourself, getting dressed or grooming yourself? No   Do you have difficulty using the toilet? No   Do you have difficulty moving around from place to place? Yes   Do you have trouble with steps or getting out of a bed or a chair? No   Current Diet Well Balanced Diet   Dental Exam Not up to date   Eye Exam Up to date   Exercise (times per week) 2 times per week   Current Exercise Activities Include Walking   Do you need help using the phone?  No   Are you deaf or do you have serious difficulty hearing?  Yes   Do you need help with transportation? Yes   Do you need help shopping? No   Do you need help preparing meals?  Yes   Do you need help with housework?  Yes   Do you need help with laundry? Yes   Do you need help taking your medications? No   Do you need help managing money? Yes   Do you ever drive or ride in a car without wearing a seat belt? Yes   Have you felt unusual stress, anger or loneliness in the last month? Yes   Who do you live with? Spouse   If you need help, do you have trouble finding someone available to you? No   Have you been bothered in the last four weeks by sexual problems? No   Do you have difficulty concentrating, remembering or making decisions? Yes         Does the patient have evidence of cognitive impairment? Yes    Asprin use counseling:Taking ASA appropriately as indicated    Age-appropriate Screening Schedule:  Refer to the list below for future screening recommendations based on patient's age, sex and/or medical conditions. Orders for these recommended tests are listed in the plan section. The patient has been provided with a written plan.    Health Maintenance   Topic Date Due   • COLONOSCOPY   1940   • TDAP/TD VACCINES (1 - Tdap) 10/14/2021 (Originally 10/14/1959)   • ZOSTER VACCINE (1 of 2) 10/27/2021 (Originally 10/14/1991)   • INFLUENZA VACCINE  Completed          The following portions of the patient's history were reviewed and updated as appropriate: allergies, current medications, past family history, past medical history, past social history, past surgical history and problem list.    Outpatient Medications Prior to Visit   Medication Sig Dispense Refill   • Acetaminophen (TYLENOL ARTHRITIS PAIN PO) Take 650 mg by mouth 2 (two) times a day.     • aspirin 81 MG EC tablet Take 81 mg by mouth Daily.     • clopidogrel (PLAVIX) 75 MG tablet Take 75 mg by mouth Daily.     • levothyroxine (SYNTHROID, LEVOTHROID) 75 MCG tablet TAKE 1 TABLET EVERY DAY 90 tablet 1   • metoprolol tartrate (LOPRESSOR) 25 MG tablet Take 25 mg by mouth Daily. ER     • Multiple Vitamins-Minerals (MULTIVITAMIN ADULT PO) Take  by mouth.     • pantoprazole (PROTONIX) 40 MG EC tablet TAKE 1 TABLET EVERY DAY 90 tablet 1   • sertraline (ZOLOFT) 25 MG tablet TAKE 1 TABLET EVERY NIGHT 90 tablet 1   • simvastatin (ZOCOR) 20 MG tablet TAKE 1 TABLET EVERY NIGHT. 90 tablet 1   • tiotropium (SPIRIVA) 18 MCG per inhalation capsule Place 1 capsule into inhaler and inhale Daily As Needed.     • triamcinolone (KENALOG) 0.1 % ointment Apply  topically to the appropriate area as directed 2 (Two) Times a Day. Psoriasis 453 g 1     No facility-administered medications prior to visit.        Patient Active Problem List   Diagnosis   • Hypothyroidism   • COPD (chronic obstructive pulmonary disease) (CMS/HCC)   • Longstanding persistent atrial fibrillation (CMS/HCC)   • Recurrent major depressive disorder, in partial remission (CMS/HCC)   • Long term (current) use of anticoagulants   • GERD without esophagitis   • Need for immunization against influenza   • Routine medical exam   • Dementia without behavioral disturbance (CMS/HCC)   • Dermatitis     • History of stroke   • Smoker       Advanced Care Planning:  ACP discussion was held with the patient during this visit. Patient has an advance directive (not in EMR), copy requested.    Review of Systems   Constitutional: Positive for fatigue. Negative for activity change, appetite change, chills, diaphoresis and unexpected weight change.   HENT: Positive for postnasal drip. Negative for congestion, dental problem, drooling, ear discharge, ear pain, facial swelling, hearing loss, mouth sores, nosebleeds, rhinorrhea, sinus pressure, sinus pain, sneezing, sore throat, tinnitus, trouble swallowing and voice change.         Dentures   Eyes: Positive for visual disturbance. Negative for photophobia, pain, discharge, redness and itching.        R MD   Blind OS    Seen at [facility]     Seen by Dr. Stanton locally   Respiratory: Positive for shortness of breath. Negative for apnea, cough, choking, chest tightness, wheezing and stridor.         COPD  Resp infection, breathing worsening.   Cardiovascular: Positive for chest pain and palpitations. Negative for leg swelling.         A-fib.    Gastrointestinal: Negative for abdominal distention, anal bleeding, blood in stool, constipation, diarrhea and rectal pain.        Bloats since CVA, S/P feeding tube.   Endocrine: Positive for cold intolerance. Negative for heat intolerance, polydipsia, polyphagia and polyuria.   Genitourinary: Positive for difficulty urinating. Negative for decreased urine volume, discharge, dysuria, enuresis, flank pain, frequency, genital sores, penile pain, penile swelling, scrotal swelling, testicular pain and urgency.        Umbilical hernia.    Musculoskeletal: Positive for arthralgias, back pain and myalgias. Negative for gait problem, joint swelling, neck pain and neck stiffness.        Chronic Foot pain   Skin: Negative for color change, pallor, rash and wound.        Toe nail fungus.     H/O R toe surgery.     Moles on back  "  Allergic/Immunologic: Positive for environmental allergies. Negative for food allergies and immunocompromised state.   Neurological: Positive for dizziness and syncope. Negative for tremors, seizures, facial asymmetry, speech difficulty, weakness and light-headedness.        L arm jerks.    Hematological: Negative for adenopathy. Bruises/bleeds easily.   Psychiatric/Behavioral: Positive for confusion and hallucinations. Negative for agitation, behavioral problems, decreased concentration, dysphoric mood, self-injury, sleep disturbance and suicidal ideas. The patient is nervous/anxious. The patient is not hyperactive.        Compared to one year ago, the patient feels his physical health is the same.  Compared to one year ago, the patient feels his mental health is the same.    Reviewed chart for potential of high risk medication in the elderly: yes  Reviewed chart for potential of harmful drug interactions in the elderly:yes    Objective         Vitals:    10/27/20 1022   BP: 144/60   BP Location: Right arm   Patient Position: Sitting   Cuff Size: Adult   Pulse: 71   Temp: 97.1 °F (36.2 °C)   TempSrc: Temporal   SpO2: 97%   Weight: 85.7 kg (189 lb)   Height: 177.8 cm (70\")   PainSc: 0-No pain       Body mass index is 27.12 kg/m².  Discussed the patient's BMI with him. The BMI is above average; BMI management plan is completed.    Physical Exam  Vitals signs and nursing note reviewed.   Constitutional:       Appearance: He is ill-appearing.      Comments: Frail, unsteady , SOA   HENT:      Head: Atraumatic.      Right Ear: Tympanic membrane normal.      Left Ear: Tympanic membrane normal.      Nose: No congestion.      Mouth/Throat:      Pharynx: No oropharyngeal exudate or posterior oropharyngeal erythema.   Eyes:      Comments: Blind OS   PRL OD   Neck:      Musculoskeletal: Normal range of motion.   Cardiovascular:      Rate and Rhythm: Normal rate.      Pulses: Normal pulses.      Heart sounds: Murmur present. "   Pulmonary:      Effort: No respiratory distress.      Breath sounds: Rhonchi present.   Abdominal:      Palpations: Abdomen is soft.      Hernia: A hernia is present.   Musculoskeletal:      Right lower leg: Edema present.      Left lower leg: Edema present.      Comments: Kyphosis, using walker, unsteady, strength 3-4/5 in ext.    Neurological:      Mental Status: He is oriented to person, place, and time.      Motor: Weakness present.      Gait: Gait abnormal.      Comments: L arm base line strenght was 4/5 now 3/5    Psychiatric:      Comments: Mild-Mod Dementia.                Assessment/Plan   Medicare Risks and Personalized Health Plan  CMS Preventative Services Quick Reference  Advance Directive Discussion  Cardiovascular risk  Dementia/Memory   Fall Risk  Obesity/Overweight    Patient's Body mass index is 27.12 kg/m². BMI is above normal parameters. Recommendations include: educational material, exercise counseling, nutrition counseling and referral to primary care.  Smoking cessation      The above risks/problems have been discussed with the patient.  Pertinent information has been shared with the patient in the After Visit Summary.  Follow up plans and orders are seen below in the Assessment/Plan Section.    Diagnoses and all orders for this visit:    1. Medicare annual wellness visit, subsequent (Primary)      Follow Up:  Return in about 1 year (around 10/27/2021) for Medicare Wellness Subsequent.     An After Visit Summary and PPPS were given to the patient.     Action Plan reviewed.        This document has been electronically signed by Bryce Collazo MD on October 27, 2020 12:22 CDT

## 2020-11-05 NOTE — PROGRESS NOTES
Subjective   Luiz Elise is a 80 y.o. male presents for destruction of precancerous lesions.    ' Have lesions on chest with horny growths, have skin cancers on face, and neck, need destroying'.     Horny keratotic lesion 14 mm R pectora, L ches R neck line.     Skin lesions on chest, neck and face, getting worse, need removing       The following portions of the patient's history were reviewed and updated as appropriate: allergies, current medications, past family history, past medical history, past social history, past surgical history and problem list.    Review of Systems   Constitutional: Positive for fatigue. Negative for activity change, appetite change, chills, diaphoresis and unexpected weight change.   HENT: Positive for postnasal drip. Negative for congestion, dental problem, drooling, ear discharge, ear pain, facial swelling, hearing loss, mouth sores, nosebleeds, rhinorrhea, sinus pressure, sinus pain, sneezing, sore throat, tinnitus, trouble swallowing and voice change.         Dentures   Eyes: Positive for visual disturbance. Negative for photophobia, pain, discharge, redness and itching.        R MD   Blind OS    Seen at [facility]     Seen by Dr. Maximino miranda   Respiratory: Positive for shortness of breath. Negative for apnea, cough, choking, chest tightness, wheezing and stridor.         COPD     Cardiovascular: Positive for chest pain and palpitations. Negative for leg swelling.         A-fib.   S/P Watchmen device   Gastrointestinal: Negative for abdominal distention, anal bleeding, blood in stool, constipation, diarrhea and rectal pain.        Bloats since CVA, S/P feeding tube.   Endocrine: Positive for cold intolerance. Negative for heat intolerance, polydipsia, polyphagia and polyuria.   Genitourinary: Positive for difficulty urinating. Negative for decreased urine volume, discharge, dysuria, enuresis, flank pain, frequency, genital sores, penile pain, penile swelling, scrotal swelling,  testicular pain and urgency.        Umbilical hernia.    Musculoskeletal: Positive for arthralgias, back pain and myalgias. Negative for gait problem, joint swelling, neck pain and neck stiffness.        Chronic Foot pain   Skin: Negative for color change, pallor, rash and wound.        Skin lesion on chest, neck, and face.    Toe nail fungus.     H/O R toe surgery.     Moles on back   Allergic/Immunologic: Positive for environmental allergies. Negative for food allergies and immunocompromised state.   Neurological: Positive for dizziness and syncope. Negative for tremors, seizures, facial asymmetry, speech difficulty, weakness and light-headedness.        L arm jerks.    Hematological: Negative for adenopathy. Bruises/bleeds easily.   Psychiatric/Behavioral: Positive for confusion and hallucinations. Negative for agitation, behavioral problems, decreased concentration, dysphoric mood, self-injury, sleep disturbance and suicidal ideas. The patient is nervous/anxious. The patient is not hyperactive.        Objective   Physical Exam  Vitals signs and nursing note reviewed.   Constitutional:       Appearance: He is not ill-appearing.      Comments: Frail, unsteady , SOA   HENT:      Head: Normocephalic and atraumatic.      Right Ear: Tympanic membrane normal.      Left Ear: Tympanic membrane normal.      Nose: No congestion.      Mouth/Throat:      Pharynx: No oropharyngeal exudate or posterior oropharyngeal erythema.   Eyes:      Comments: Blind OS   PRL OD   Neck:      Musculoskeletal: Normal range of motion.   Cardiovascular:      Rate and Rhythm: Normal rate.      Pulses: Normal pulses.      Heart sounds: Murmur present.   Pulmonary:      Effort: No respiratory distress.      Breath sounds: Rhonchi present.   Abdominal:      Palpations: Abdomen is soft.      Hernia: A hernia is present.   Musculoskeletal:      Right lower leg: Edema present.      Left lower leg: Edema present.      Comments: Kyphosis, using walker,  "unsteady, strength 3-4/5 in ext.    Skin:     Findings: Lesion present.      Comments: 14 mm  Horned keratotic lesion R pectoral area. 10 mm L mid chest. Two 12 mm AKs R neck. Three AKs L face.   Neurological:      Mental Status: He is alert and oriented to person, place, and time.      Motor: Weakness present.      Gait: Gait abnormal.      Comments: L arm base line strenght was 4/5 now 3/5    Psychiatric:         Mood and Affect: Mood normal.         Behavior: Behavior normal.         Thought Content: Thought content normal.         Judgment: Judgment normal.      Comments: Mild-Mod Dementia.            Assessment/Plan   Diagnoses and all orders for this visit:    1. Keratosis    2. Actinic keratosis    Other orders  -     ammonium lactate (Lac-Hydrin) 12 % cream; Apply  topically to the appropriate area as directed As Needed (To rough skin lesions once daily as needed).  Dispense: 1 each; Refill: 3      Discussed destruction of skin lesions, Pt and wife desire destruction of lesions.     Destruction of Lesion    Date/Time: 11/5/2020 11:30 AM  Performed by: Bryce Collazo MD  Authorized by: Bryce Collazo MD   Consent: Verbal consent obtained. Written consent obtained.  Consent given by: patient and spouse  Patient understanding: patient states understanding of the procedure being performed  Patient consent: the patient's understanding of the procedure matches consent given  Procedure consent: procedure consent matches procedure scheduled  Site marked: the operative site was marked  Patient identity confirmed: verbally with patient  Time out: Immediately prior to procedure a \"time out\" was called to verify the correct patient, procedure, equipment, support staff and site/side marked as required.  Local anesthesia used: yes  Anesthesia: local infiltration    Anesthesia:  Local anesthesia used: yes  Local Anesthetic: lidocaine 1% without epinephrine  Anesthetic total: 2 mL  Patient tolerance: patient " tolerated the procedure well with no immediate complications  Comments: 5 skin lesions marked, cleansed, injected at base, then destroyed with Hyfrecator.             This document has been electronically signed by Bryce Collazo MD

## 2020-11-08 PROBLEM — L57.0 ACTINIC KERATOSIS: Status: ACTIVE | Noted: 2020-01-01

## 2020-11-08 PROBLEM — L57.0 KERATOSIS: Status: ACTIVE | Noted: 2020-01-01

## 2020-12-28 NOTE — TELEPHONE ENCOUNTER
"Wife is trying to get patient into see his PCP. MD office at lunch. Jonah transferred to [facility].  Last week tired to get out of bed and he fell EMS called and he went to [facility] he has pneumonia. Was discharged on Levaquin and on Prednisone  He is so weak and can't walk. He has had nose bleeds He is on Plavix. \"He is just not.... something is wrong\".  Care advise per guideline. Wife says he does not want to go to the ER. She is not sure he will go.  Reason for Disposition  • Patient sounds very sick or weak to the triager    Additional Information  • Negative: Severe difficulty breathing (e.g., struggling for each breath, speaks in single words)  • Negative: Shock suspected (e.g., cold/pale/clammy skin, too weak to stand, low BP, rapid pulse)  • Negative: Difficult to awaken or acting confused (e.g., disoriented, slurred speech)  • Negative: [1] Fainted > 15 minutes ago AND [2] still feels too weak or dizzy to stand  • Negative: [1] SEVERE weakness (i.e., unable to walk or barely able to walk, requires support) AND [2] new onset or worsening  • Negative: Sounds like a life-threatening emergency to the triager  • Negative: Weakness of the face, arm or leg on one side of the body  • Negative: [1] Has diabetes (diabetes mellitus) AND [2] weakness from low blood sugar (i.e., < 60 mg/dl or 3.5 mmol/l)  • Negative: Heat exhaustion suspected (i.e., dehydration from heat exposure)  • Negative: Chest pain  • Negative: Vomiting is main symptom  • Negative: Diarrhea is main symptom  • Negative: Difficulty breathing  • Negative: Heart beating < 50 beats per minute OR > 140 beats per minute  • Negative: Extra heart beats OR irregular heart beating   (i.e., \"palpitations\")  • Negative: Follows bleeding (e.g., from vomiting, rectum, vagina; Exception: small brief weakness from sight of a small amount blood)  • Negative: Black or tarry bowel movements  • Negative: [1] Drinking very little AND [2] dehydration suspected (e.g., no " "urine > 12 hours, very dry mouth, very lightheaded)    Answer Assessment - Initial Assessment Questions  1. DESCRIPTION: \"Describe how you are feeling.\"      Extreme weakness, frequent falling, getting worse.  2. SEVERITY: \"How bad is it?\"  \"Can you stand and walk?\"    - MILD - Feels weak or tired, but does not interfere with work, school or normal activities    - MODERATE - Able to stand and walk; weakness interferes with work, school, or normal activities    - SEVERE - Unable to stand or walk    Severe unable to walk alone has to be helped, frequent falls  3. ONSET:  \"When did the weakness begin?\"     Over 1 week ago   4. CAUSE: \"What do you think is causing the weakness?\"  diagnosed with pnumonia  5. MEDICINES: \"Have you recently started a new medicine or had a change in the amount of a medicine?\"  Levaquin and steriod  6. OTHER SYMPTOMS: \"Do you have any other symptoms?\" (e.g., chest pain, fever, cough, SOB, vomiting, diarrhea, bleeding, other areas of pain)   He is agitated, he is cold all the time coughing  7. PREGNANCY: \"Is there any chance you are pregnant?\" \"When was your last menstrual period?\"     na    Protocols used: WEAKNESS (GENERALIZED) AND FATIGUE-ADULT-AH      "

## 2020-12-28 NOTE — ED TRIAGE NOTES
Pt presents to ED with c/o shortness of breath, generalized weakness, and nosebleed. Pt was diagnosed with pneumonia 6 days ago and was negative for covid at that time.

## 2021-01-01 ENCOUNTER — OFFICE VISIT (OUTPATIENT)
Dept: FAMILY MEDICINE CLINIC | Facility: CLINIC | Age: 81
End: 2021-01-01

## 2021-01-01 ENCOUNTER — APPOINTMENT (OUTPATIENT)
Dept: GENERAL RADIOLOGY | Facility: HOSPITAL | Age: 81
End: 2021-01-01

## 2021-01-01 ENCOUNTER — TELEPHONE (OUTPATIENT)
Dept: FAMILY MEDICINE CLINIC | Facility: CLINIC | Age: 81
End: 2021-01-01

## 2021-01-01 ENCOUNTER — APPOINTMENT (OUTPATIENT)
Dept: INTERVENTIONAL RADIOLOGY/VASCULAR | Facility: HOSPITAL | Age: 81
End: 2021-01-01

## 2021-01-01 ENCOUNTER — LAB (OUTPATIENT)
Dept: LAB | Facility: HOSPITAL | Age: 81
End: 2021-01-01

## 2021-01-01 ENCOUNTER — CLINICAL SUPPORT (OUTPATIENT)
Dept: FAMILY MEDICINE CLINIC | Facility: CLINIC | Age: 81
End: 2021-01-01

## 2021-01-01 ENCOUNTER — ANESTHESIA EVENT (OUTPATIENT)
Dept: PERIOP | Facility: HOSPITAL | Age: 81
End: 2021-01-01

## 2021-01-01 ENCOUNTER — APPOINTMENT (OUTPATIENT)
Dept: CT IMAGING | Facility: HOSPITAL | Age: 81
End: 2021-01-01

## 2021-01-01 ENCOUNTER — ANESTHESIA (OUTPATIENT)
Dept: PERIOP | Facility: HOSPITAL | Age: 81
End: 2021-01-01

## 2021-01-01 ENCOUNTER — APPOINTMENT (OUTPATIENT)
Dept: CARDIOLOGY | Facility: HOSPITAL | Age: 81
End: 2021-01-01

## 2021-01-01 ENCOUNTER — HOSPITAL ENCOUNTER (INPATIENT)
Facility: HOSPITAL | Age: 81
LOS: 6 days | Discharge: HOSPICE | End: 2021-09-25
Attending: EMERGENCY MEDICINE | Admitting: FAMILY MEDICINE

## 2021-01-01 ENCOUNTER — APPOINTMENT (OUTPATIENT)
Dept: ULTRASOUND IMAGING | Facility: HOSPITAL | Age: 81
End: 2021-01-01

## 2021-01-01 VITALS
DIASTOLIC BLOOD PRESSURE: 60 MMHG | BODY MASS INDEX: 27.19 KG/M2 | OXYGEN SATURATION: 91 % | WEIGHT: 183.6 LBS | HEIGHT: 69 IN | HEART RATE: 80 BPM | TEMPERATURE: 97.8 F | SYSTOLIC BLOOD PRESSURE: 124 MMHG

## 2021-01-01 VITALS
OXYGEN SATURATION: 93 % | BODY MASS INDEX: 28.58 KG/M2 | WEIGHT: 193 LBS | TEMPERATURE: 97.5 F | HEART RATE: 74 BPM | SYSTOLIC BLOOD PRESSURE: 130 MMHG | DIASTOLIC BLOOD PRESSURE: 60 MMHG | HEIGHT: 69 IN

## 2021-01-01 VITALS
HEIGHT: 69 IN | DIASTOLIC BLOOD PRESSURE: 62 MMHG | WEIGHT: 189.7 LBS | SYSTOLIC BLOOD PRESSURE: 126 MMHG | BODY MASS INDEX: 28.1 KG/M2 | HEART RATE: 81 BPM | TEMPERATURE: 96.9 F | OXYGEN SATURATION: 89 %

## 2021-01-01 VITALS
DIASTOLIC BLOOD PRESSURE: 71 MMHG | HEART RATE: 72 BPM | WEIGHT: 191.58 LBS | SYSTOLIC BLOOD PRESSURE: 164 MMHG | OXYGEN SATURATION: 100 % | BODY MASS INDEX: 28.38 KG/M2 | HEIGHT: 69 IN | TEMPERATURE: 97.3 F | RESPIRATION RATE: 28 BRPM

## 2021-01-01 VITALS
HEIGHT: 69 IN | WEIGHT: 190.2 LBS | SYSTOLIC BLOOD PRESSURE: 132 MMHG | BODY MASS INDEX: 28.17 KG/M2 | OXYGEN SATURATION: 98 % | TEMPERATURE: 98.6 F | DIASTOLIC BLOOD PRESSURE: 68 MMHG | HEART RATE: 78 BPM

## 2021-01-01 VITALS
WEIGHT: 193 LBS | DIASTOLIC BLOOD PRESSURE: 60 MMHG | HEART RATE: 75 BPM | SYSTOLIC BLOOD PRESSURE: 142 MMHG | HEIGHT: 69 IN | OXYGEN SATURATION: 97 % | BODY MASS INDEX: 28.58 KG/M2 | TEMPERATURE: 98 F

## 2021-01-01 DIAGNOSIS — Z00.00 ROUTINE CHECK-UP: ICD-10-CM

## 2021-01-01 DIAGNOSIS — J42 CHRONIC BRONCHITIS, UNSPECIFIED CHRONIC BRONCHITIS TYPE (HCC): ICD-10-CM

## 2021-01-01 DIAGNOSIS — K21.9 GERD WITHOUT ESOPHAGITIS: ICD-10-CM

## 2021-01-01 DIAGNOSIS — Z86.73 HISTORY OF STROKE: ICD-10-CM

## 2021-01-01 DIAGNOSIS — F17.200 SMOKER: Primary | ICD-10-CM

## 2021-01-01 DIAGNOSIS — E03.9 HYPOTHYROIDISM, UNSPECIFIED TYPE: ICD-10-CM

## 2021-01-01 DIAGNOSIS — Z87.01 H/O: PNEUMONIA: ICD-10-CM

## 2021-01-01 DIAGNOSIS — J18.9 PNEUMONIA OF RIGHT LOWER LOBE DUE TO INFECTIOUS ORGANISM: ICD-10-CM

## 2021-01-01 DIAGNOSIS — S22.080A COMPRESSION FRACTURE OF T12 VERTEBRA, INITIAL ENCOUNTER (HCC): ICD-10-CM

## 2021-01-01 DIAGNOSIS — F17.200 SMOKER: ICD-10-CM

## 2021-01-01 DIAGNOSIS — F03.90 DEMENTIA WITHOUT BEHAVIORAL DISTURBANCE, UNSPECIFIED DEMENTIA TYPE: Primary | ICD-10-CM

## 2021-01-01 DIAGNOSIS — J18.9 PNEUMONIA OF RIGHT LUNG DUE TO INFECTIOUS ORGANISM, UNSPECIFIED PART OF LUNG: ICD-10-CM

## 2021-01-01 DIAGNOSIS — F03.90 DEMENTIA WITHOUT BEHAVIORAL DISTURBANCE, UNSPECIFIED DEMENTIA TYPE: ICD-10-CM

## 2021-01-01 DIAGNOSIS — K35.80 ACUTE APPENDICITIS, UNSPECIFIED ACUTE APPENDICITIS TYPE: ICD-10-CM

## 2021-01-01 DIAGNOSIS — J98.4 CAVITARY LESION OF LUNG: ICD-10-CM

## 2021-01-01 DIAGNOSIS — I48.11 LONGSTANDING PERSISTENT ATRIAL FIBRILLATION (HCC): ICD-10-CM
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
## 2021-01-01 DIAGNOSIS — I48.91 ATRIAL FIBRILLATION, UNSPECIFIED TYPE (HCC): ICD-10-CM

## 2021-01-01 DIAGNOSIS — M79.672 LEFT FOOT PAIN: ICD-10-CM

## 2021-01-01 DIAGNOSIS — K55.1 SUPERIOR MESENTERIC ARTERY STENOSIS (HCC): ICD-10-CM

## 2021-01-01 DIAGNOSIS — R06.03 ACUTE RESPIRATORY DISTRESS: Primary | ICD-10-CM

## 2021-01-01 DIAGNOSIS — N39.0 UTI (URINARY TRACT INFECTION), BACTERIAL: ICD-10-CM

## 2021-01-01 DIAGNOSIS — I10 ESSENTIAL HYPERTENSION: ICD-10-CM

## 2021-01-01 DIAGNOSIS — R13.12 OROPHARYNGEAL DYSPHAGIA: ICD-10-CM

## 2021-01-01 DIAGNOSIS — R93.89 ABNORMAL CHEST X-RAY: ICD-10-CM

## 2021-01-01 DIAGNOSIS — R06.00 DYSPNEA, UNSPECIFIED TYPE: ICD-10-CM

## 2021-01-01 DIAGNOSIS — F33.41 RECURRENT MAJOR DEPRESSIVE DISORDER, IN PARTIAL REMISSION (HCC): ICD-10-CM

## 2021-01-01 DIAGNOSIS — R09.89 CHEST CONGESTION: ICD-10-CM

## 2021-01-01 DIAGNOSIS — J98.4 LUNG DENSITY ON X-RAY: ICD-10-CM

## 2021-01-01 DIAGNOSIS — E78.5 HYPERLIPIDEMIA LDL GOAL <70: ICD-10-CM

## 2021-01-01 DIAGNOSIS — D50.0 IRON DEFICIENCY ANEMIA DUE TO CHRONIC BLOOD LOSS: ICD-10-CM

## 2021-01-01 DIAGNOSIS — Z79.01 LONG TERM (CURRENT) USE OF ANTICOAGULANTS: ICD-10-CM

## 2021-01-01 DIAGNOSIS — K42.9 UMBILICAL HERNIA WITHOUT OBSTRUCTION AND WITHOUT GANGRENE: ICD-10-CM

## 2021-01-01 DIAGNOSIS — R93.89 ABNORMAL CXR (CHEST X-RAY): Primary | ICD-10-CM

## 2021-01-01 DIAGNOSIS — D64.9 ANEMIA, UNSPECIFIED TYPE: ICD-10-CM

## 2021-01-01 DIAGNOSIS — G45.9 TIA (TRANSIENT ISCHEMIC ATTACK): ICD-10-CM

## 2021-01-01 DIAGNOSIS — J18.9 PNEUMONIA DUE TO INFECTIOUS ORGANISM, UNSPECIFIED LATERALITY, UNSPECIFIED PART OF LUNG: ICD-10-CM

## 2021-01-01 DIAGNOSIS — Z11.1 VISIT FOR TB SKIN TEST: Primary | ICD-10-CM

## 2021-01-01 DIAGNOSIS — M25.572 ACUTE LEFT ANKLE PAIN: ICD-10-CM

## 2021-01-01 DIAGNOSIS — A49.9 UTI (URINARY TRACT INFECTION), BACTERIAL: ICD-10-CM

## 2021-01-01 DIAGNOSIS — R93.89 ABNORMAL CXR (CHEST X-RAY): ICD-10-CM

## 2021-01-01 LAB
ABO GROUP BLD: NORMAL
ALBUMIN SERPL-MCNC: 2.5 G/DL (ref 3.5–5.2)
ALBUMIN SERPL-MCNC: 2.6 G/DL (ref 3.5–5.2)
ALBUMIN SERPL-MCNC: 2.6 G/DL (ref 3.5–5.2)
ALBUMIN SERPL-MCNC: 3 G/DL (ref 3.5–5.2)
ALBUMIN SERPL-MCNC: 3.9 G/DL (ref 3.5–5.2)
ALBUMIN SERPL-MCNC: 3.9 G/DL (ref 3.5–5.2)
ALBUMIN/GLOB SERPL: 0.8 G/DL
ALBUMIN/GLOB SERPL: 0.8 G/DL
ALBUMIN/GLOB SERPL: 0.9 G/DL
ALBUMIN/GLOB SERPL: 1 G/DL
ALBUMIN/GLOB SERPL: 1 G/DL
ALBUMIN/GLOB SERPL: 1.2 G/DL
ALP SERPL-CCNC: 123 U/L (ref 39–117)
ALP SERPL-CCNC: 67 U/L (ref 39–117)
ALP SERPL-CCNC: 69 U/L (ref 39–117)
ALP SERPL-CCNC: 74 U/L (ref 39–117)
ALP SERPL-CCNC: 75 U/L (ref 39–117)
ALP SERPL-CCNC: 89 U/L (ref 39–117)
ALT SERPL W P-5'-P-CCNC: 14 U/L (ref 1–41)
ALT SERPL W P-5'-P-CCNC: 16 U/L (ref 1–41)
ALT SERPL W P-5'-P-CCNC: 16 U/L (ref 1–41)
ALT SERPL W P-5'-P-CCNC: 18 U/L (ref 1–41)
AMYLASE SERPL-CCNC: 37 U/L (ref 28–100)
ANION GAP SERPL CALCULATED.3IONS-SCNC: 11 MMOL/L (ref 5–15)
ANION GAP SERPL CALCULATED.3IONS-SCNC: 11 MMOL/L (ref 5–15)
ANION GAP SERPL CALCULATED.3IONS-SCNC: 12 MMOL/L (ref 5–15)
ANION GAP SERPL CALCULATED.3IONS-SCNC: 14 MMOL/L (ref 5–15)
ANION GAP SERPL CALCULATED.3IONS-SCNC: 14 MMOL/L (ref 5–15)
ANION GAP SERPL CALCULATED.3IONS-SCNC: 7.7 MMOL/L (ref 5–15)
ANION GAP SERPL CALCULATED.3IONS-SCNC: 9 MMOL/L (ref 5–15)
ANISOCYTOSIS BLD QL: ABNORMAL
ARTERIAL PATENCY WRIST A: ABNORMAL
ARTERIAL PATENCY WRIST A: ABNORMAL
AST SERPL-CCNC: 16 U/L (ref 1–40)
AST SERPL-CCNC: 18 U/L (ref 1–40)
AST SERPL-CCNC: 20 U/L (ref 1–40)
AST SERPL-CCNC: 21 U/L (ref 1–40)
AST SERPL-CCNC: 26 U/L (ref 1–40)
AST SERPL-CCNC: 27 U/L (ref 1–40)
ATMOSPHERIC PRESS: 749 MMHG
ATMOSPHERIC PRESS: 750 MMHG
BACTERIA BLD CULT: ABNORMAL
BACTERIA SPEC AEROBE CULT: ABNORMAL
BACTERIA SPEC AEROBE CULT: NORMAL
BACTERIA UR QL AUTO: ABNORMAL /HPF
BASE EXCESS BLDA CALC-SCNC: -0.4 MMOL/L (ref 0–2)
BASE EXCESS BLDA CALC-SCNC: 7.6 MMOL/L (ref 0–2)
BASOPHILS # BLD AUTO: 0.01 10*3/MM3 (ref 0–0.2)
BASOPHILS # BLD AUTO: 0.02 10*3/MM3 (ref 0–0.2)
BASOPHILS # BLD AUTO: 0.04 10*3/MM3 (ref 0–0.2)
BASOPHILS # BLD MANUAL: 0.05 10*3/MM3 (ref 0–0.2)
BASOPHILS NFR BLD AUTO: 0.1 % (ref 0–1.5)
BASOPHILS NFR BLD AUTO: 0.3 % (ref 0–1.5)
BASOPHILS NFR BLD AUTO: 0.4 % (ref 0–1.5)
BASOPHILS NFR BLD AUTO: 1 % (ref 0–1.5)
BDY SITE: ABNORMAL
BDY SITE: ABNORMAL
BH CV ECHO MEAS - ACS: 1.8 CM
BH CV ECHO MEAS - AI DEC SLOPE: 233 CM/SEC^2
BH CV ECHO MEAS - AI MAX PG: 67.6 MMHG
BH CV ECHO MEAS - AI MAX VEL: 411 CM/SEC
BH CV ECHO MEAS - AI P1/2T: 516.6 MSEC
BH CV ECHO MEAS - AO MAX PG (FULL): 7.3 MMHG
BH CV ECHO MEAS - AO MAX PG: 11.8 MMHG
BH CV ECHO MEAS - AO MEAN PG (FULL): 3 MMHG
BH CV ECHO MEAS - AO MEAN PG: 5 MMHG
BH CV ECHO MEAS - AO ROOT AREA (BSA CORRECTED): 1.8
BH CV ECHO MEAS - AO ROOT AREA: 10.8 CM^2
BH CV ECHO MEAS - AO ROOT DIAM: 3.7 CM
BH CV ECHO MEAS - AO V2 MAX: 172 CM/SEC
BH CV ECHO MEAS - AO V2 MEAN: 103 CM/SEC
BH CV ECHO MEAS - AO V2 VTI: 29.1 CM
BH CV ECHO MEAS - ASC AORTA: 3.8 CM
BH CV ECHO MEAS - AVA(I,A): 1.9 CM^2
BH CV ECHO MEAS - AVA(I,D): 1.9 CM^2
BH CV ECHO MEAS - AVA(V,A): 2 CM^2
BH CV ECHO MEAS - AVA(V,D): 2 CM^2
BH CV ECHO MEAS - BSA(HAYCOCK): 2.1 M^2
BH CV ECHO MEAS - BSA: 2 M^2
BH CV ECHO MEAS - BZI_BMI: 28.2 KILOGRAMS/M^2
BH CV ECHO MEAS - BZI_METRIC_HEIGHT: 175.3 CM
BH CV ECHO MEAS - BZI_METRIC_WEIGHT: 86.6 KG
BH CV ECHO MEAS - EDV(CUBED): 174.7 ML
BH CV ECHO MEAS - EDV(MOD-SP2): 72 ML
BH CV ECHO MEAS - EDV(MOD-SP4): 92.4 ML
BH CV ECHO MEAS - EDV(TEICH): 153 ML
BH CV ECHO MEAS - EF(CUBED): 77.7 %
BH CV ECHO MEAS - EF(MOD-SP2): 61 %
BH CV ECHO MEAS - EF(MOD-SP4): 61.4 %
BH CV ECHO MEAS - EF(TEICH): 69.2 %
BH CV ECHO MEAS - ESV(CUBED): 39 ML
BH CV ECHO MEAS - ESV(MOD-SP2): 28.1 ML
BH CV ECHO MEAS - ESV(MOD-SP4): 35.7 ML
BH CV ECHO MEAS - ESV(TEICH): 47.1 ML
BH CV ECHO MEAS - FS: 39.4 %
BH CV ECHO MEAS - IVS/LVPW: 0.88
BH CV ECHO MEAS - IVSD: 0.98 CM
BH CV ECHO MEAS - LA DIMENSION: 3.5 CM
BH CV ECHO MEAS - LA/AO: 0.95
BH CV ECHO MEAS - LV DIASTOLIC VOL/BSA (35-75): 45.6 ML/M^2
BH CV ECHO MEAS - LV MASS(C)D: 234.2 GRAMS
BH CV ECHO MEAS - LV MASS(C)DI: 115.6 GRAMS/M^2
BH CV ECHO MEAS - LV MAX PG: 4.6 MMHG
BH CV ECHO MEAS - LV MEAN PG: 2 MMHG
BH CV ECHO MEAS - LV SYSTOLIC VOL/BSA (12-30): 17.6 ML/M^2
BH CV ECHO MEAS - LV V1 MAX: 107 CM/SEC
BH CV ECHO MEAS - LV V1 MEAN: 63.3 CM/SEC
BH CV ECHO MEAS - LV V1 VTI: 17.5 CM
BH CV ECHO MEAS - LVIDD: 5.6 CM
BH CV ECHO MEAS - LVIDS: 3.4 CM
BH CV ECHO MEAS - LVLD AP2: 7.7 CM
BH CV ECHO MEAS - LVLD AP4: 8 CM
BH CV ECHO MEAS - LVLS AP2: 6.5 CM
BH CV ECHO MEAS - LVLS AP4: 6.5 CM
BH CV ECHO MEAS - LVOT AREA (M): 3.1 CM^2
BH CV ECHO MEAS - LVOT AREA: 3.1 CM^2
BH CV ECHO MEAS - LVOT DIAM: 2 CM
BH CV ECHO MEAS - LVPWD: 1.1 CM
BH CV ECHO MEAS - MV A MAX VEL: 109 CM/SEC
BH CV ECHO MEAS - MV DEC SLOPE: 585 CM/SEC^2
BH CV ECHO MEAS - MV E MAX VEL: 89.1 CM/SEC
BH CV ECHO MEAS - MV E/A: 0.82
BH CV ECHO MEAS - MV MAX PG: 7.8 MMHG
BH CV ECHO MEAS - MV MEAN PG: 3 MMHG
BH CV ECHO MEAS - MV P1/2T MAX VEL: 103 CM/SEC
BH CV ECHO MEAS - MV P1/2T: 51.6 MSEC
BH CV ECHO MEAS - MV V2 MAX: 140 CM/SEC
BH CV ECHO MEAS - MV V2 MEAN: 74.3 CM/SEC
BH CV ECHO MEAS - MV V2 VTI: 35.3 CM
BH CV ECHO MEAS - MVA P1/2T LCG: 2.1 CM^2
BH CV ECHO MEAS - MVA(P1/2T): 4.3 CM^2
BH CV ECHO MEAS - MVA(VTI): 1.6 CM^2
BH CV ECHO MEAS - PA MAX PG: 3.5 MMHG
BH CV ECHO MEAS - PA V2 MAX: 93.1 CM/SEC
BH CV ECHO MEAS - RAP SYSTOLE: 5 MMHG
BH CV ECHO MEAS - RVDD: 2.4 CM
BH CV ECHO MEAS - RVSP: 43.2 MMHG
BH CV ECHO MEAS - SI(AO): 154.5 ML/M^2
BH CV ECHO MEAS - SI(CUBED): 67 ML/M^2
BH CV ECHO MEAS - SI(LVOT): 27.1 ML/M^2
BH CV ECHO MEAS - SI(MOD-SP2): 21.7 ML/M^2
BH CV ECHO MEAS - SI(MOD-SP4): 28 ML/M^2
BH CV ECHO MEAS - SI(TEICH): 52.3 ML/M^2
BH CV ECHO MEAS - SV(AO): 312.9 ML
BH CV ECHO MEAS - SV(CUBED): 135.7 ML
BH CV ECHO MEAS - SV(LVOT): 55 ML
BH CV ECHO MEAS - SV(MOD-SP2): 43.9 ML
BH CV ECHO MEAS - SV(MOD-SP4): 56.7 ML
BH CV ECHO MEAS - SV(TEICH): 105.9 ML
BH CV ECHO MEAS - TR MAX VEL: 309 CM/SEC
BILIRUB SERPL-MCNC: 0.3 MG/DL (ref 0–1.2)
BILIRUB SERPL-MCNC: 0.3 MG/DL (ref 0–1.2)
BILIRUB SERPL-MCNC: 0.4 MG/DL (ref 0–1.2)
BILIRUB SERPL-MCNC: 0.4 MG/DL (ref 0–1.2)
BILIRUB SERPL-MCNC: 0.5 MG/DL (ref 0–1.2)
BILIRUB SERPL-MCNC: 1 MG/DL (ref 0–1.2)
BILIRUB UR QL STRIP: NEGATIVE
BLD GP AB SCN SERPL QL: NEGATIVE
BUN SERPL-MCNC: 13 MG/DL (ref 8–23)
BUN SERPL-MCNC: 14 MG/DL (ref 8–23)
BUN SERPL-MCNC: 21 MG/DL (ref 8–23)
BUN SERPL-MCNC: 25 MG/DL (ref 8–23)
BUN SERPL-MCNC: 5 MG/DL (ref 8–23)
BUN/CREAT SERPL: 13.5 (ref 7–25)
BUN/CREAT SERPL: 13.7 (ref 7–25)
BUN/CREAT SERPL: 14 (ref 7–25)
BUN/CREAT SERPL: 15.7 (ref 7–25)
BUN/CREAT SERPL: 18.1 (ref 7–25)
BUN/CREAT SERPL: 18.4 (ref 7–25)
BUN/CREAT SERPL: 27.3 (ref 7–25)
BUN/CREAT SERPL: 30.5 (ref 7–25)
BUN/CREAT SERPL: 6 (ref 7–25)
CALCIUM SPEC-SCNC: 8 MG/DL (ref 8.6–10.5)
CALCIUM SPEC-SCNC: 8.1 MG/DL (ref 8.6–10.5)
CALCIUM SPEC-SCNC: 8.4 MG/DL (ref 8.6–10.5)
CALCIUM SPEC-SCNC: 8.6 MG/DL (ref 8.6–10.5)
CALCIUM SPEC-SCNC: 8.8 MG/DL (ref 8.6–10.5)
CALCIUM SPEC-SCNC: 9.1 MG/DL (ref 8.6–10.5)
CHLORIDE SERPL-SCNC: 102 MMOL/L (ref 98–107)
CHLORIDE SERPL-SCNC: 103 MMOL/L (ref 98–107)
CHLORIDE SERPL-SCNC: 109 MMOL/L (ref 98–107)
CHLORIDE SERPL-SCNC: 114 MMOL/L (ref 98–107)
CHLORIDE SERPL-SCNC: 118 MMOL/L (ref 98–107)
CHLORIDE SERPL-SCNC: 121 MMOL/L (ref 98–107)
CHLORIDE SERPL-SCNC: 94 MMOL/L (ref 98–107)
CHLORIDE SERPL-SCNC: 95 MMOL/L (ref 98–107)
CHLORIDE SERPL-SCNC: 97 MMOL/L (ref 98–107)
CHOLEST SERPL-MCNC: 131 MG/DL (ref 0–200)
CLARITY UR: CLEAR
CO2 SERPL-SCNC: 21 MMOL/L (ref 22–29)
CO2 SERPL-SCNC: 22 MMOL/L (ref 22–29)
CO2 SERPL-SCNC: 22 MMOL/L (ref 22–29)
CO2 SERPL-SCNC: 23 MMOL/L (ref 22–29)
CO2 SERPL-SCNC: 23 MMOL/L (ref 22–29)
CO2 SERPL-SCNC: 24 MMOL/L (ref 22–29)
CO2 SERPL-SCNC: 25 MMOL/L (ref 22–29)
CO2 SERPL-SCNC: 27 MMOL/L (ref 22–29)
CO2 SERPL-SCNC: 27.3 MMOL/L (ref 22–29)
COLOR UR: ABNORMAL
CREAT SERPL-MCNC: 0.72 MG/DL (ref 0.76–1.27)
CREAT SERPL-MCNC: 0.76 MG/DL (ref 0.76–1.27)
CREAT SERPL-MCNC: 0.77 MG/DL (ref 0.76–1.27)
CREAT SERPL-MCNC: 0.82 MG/DL (ref 0.76–1.27)
CREAT SERPL-MCNC: 0.83 MG/DL (ref 0.76–1.27)
CREAT SERPL-MCNC: 0.84 MG/DL (ref 0.76–1.27)
CREAT SERPL-MCNC: 0.96 MG/DL (ref 0.76–1.27)
CREAT SERPL-MCNC: 1 MG/DL (ref 0.76–1.27)
CREAT SERPL-MCNC: 1.02 MG/DL (ref 0.76–1.27)
D-LACTATE SERPL-SCNC: 1 MMOL/L (ref 0.5–2)
DEPRECATED RDW RBC AUTO: 41.6 FL (ref 37–54)
DEPRECATED RDW RBC AUTO: 42.7 FL (ref 37–54)
DEPRECATED RDW RBC AUTO: 43.8 FL (ref 37–54)
DEPRECATED RDW RBC AUTO: 44.4 FL (ref 37–54)
DEPRECATED RDW RBC AUTO: 46.4 FL (ref 37–54)
DEPRECATED RDW RBC AUTO: 46.6 FL (ref 37–54)
DEPRECATED RDW RBC AUTO: 48.1 FL (ref 37–54)
DEPRECATED RDW RBC AUTO: 52 FL (ref 37–54)
ELLIPTOCYTES BLD QL SMEAR: ABNORMAL
EOSINOPHIL # BLD AUTO: 0 10*3/MM3 (ref 0–0.4)
EOSINOPHIL # BLD MANUAL: 0.16 10*3/MM3 (ref 0–0.4)
EOSINOPHIL NFR BLD AUTO: 0 % (ref 0.3–6.2)
EOSINOPHIL NFR BLD MANUAL: 3.1 % (ref 0.3–6.2)
ERYTHROCYTE [DISTWIDTH] IN BLOOD BY AUTOMATED COUNT: 16.5 % (ref 12.3–15.4)
ERYTHROCYTE [DISTWIDTH] IN BLOOD BY AUTOMATED COUNT: 16.6 % (ref 12.3–15.4)
ERYTHROCYTE [DISTWIDTH] IN BLOOD BY AUTOMATED COUNT: 16.7 % (ref 12.3–15.4)
ERYTHROCYTE [DISTWIDTH] IN BLOOD BY AUTOMATED COUNT: 16.8 % (ref 12.3–15.4)
ERYTHROCYTE [DISTWIDTH] IN BLOOD BY AUTOMATED COUNT: 17.4 % (ref 12.3–15.4)
ERYTHROCYTE [DISTWIDTH] IN BLOOD BY AUTOMATED COUNT: 17.6 % (ref 12.3–15.4)
ERYTHROCYTE [DISTWIDTH] IN BLOOD BY AUTOMATED COUNT: 18.1 % (ref 12.3–15.4)
ERYTHROCYTE [DISTWIDTH] IN BLOOD BY AUTOMATED COUNT: 19 % (ref 12.3–15.4)
FLUAV RNA RESP QL NAA+PROBE: NOT DETECTED
FLUBV RNA RESP QL NAA+PROBE: NOT DETECTED
GAS FLOW AIRWAY: 4 LPM
GFR SERPL CREATININE-BSD FRML MDRD: 105 ML/MIN/1.73
GFR SERPL CREATININE-BSD FRML MDRD: 70 ML/MIN/1.73
GFR SERPL CREATININE-BSD FRML MDRD: 72 ML/MIN/1.73
GFR SERPL CREATININE-BSD FRML MDRD: 76 ML/MIN/1.73
GFR SERPL CREATININE-BSD FRML MDRD: 88 ML/MIN/1.73
GFR SERPL CREATININE-BSD FRML MDRD: 89 ML/MIN/1.73
GFR SERPL CREATININE-BSD FRML MDRD: 91 ML/MIN/1.73
GFR SERPL CREATININE-BSD FRML MDRD: 97 ML/MIN/1.73
GFR SERPL CREATININE-BSD FRML MDRD: 99 ML/MIN/1.73
GLOBULIN UR ELPH-MCNC: 2.8 GM/DL
GLOBULIN UR ELPH-MCNC: 3 GM/DL
GLOBULIN UR ELPH-MCNC: 3.1 GM/DL
GLOBULIN UR ELPH-MCNC: 3.2 GM/DL
GLOBULIN UR ELPH-MCNC: 3.2 GM/DL
GLOBULIN UR ELPH-MCNC: 3.9 GM/DL
GLUCOSE BLDC GLUCOMTR-MCNC: 171 MG/DL (ref 70–130)
GLUCOSE BLDC GLUCOMTR-MCNC: 223 MG/DL (ref 70–130)
GLUCOSE BLDC GLUCOMTR-MCNC: 228 MG/DL (ref 70–130)
GLUCOSE BLDC GLUCOMTR-MCNC: 231 MG/DL (ref 70–130)
GLUCOSE BLDC GLUCOMTR-MCNC: 281 MG/DL (ref 70–130)
GLUCOSE SERPL-MCNC: 102 MG/DL (ref 65–99)
GLUCOSE SERPL-MCNC: 110 MG/DL (ref 65–99)
GLUCOSE SERPL-MCNC: 120 MG/DL (ref 65–99)
GLUCOSE SERPL-MCNC: 123 MG/DL (ref 65–99)
GLUCOSE SERPL-MCNC: 137 MG/DL (ref 65–99)
GLUCOSE SERPL-MCNC: 148 MG/DL (ref 65–99)
GLUCOSE SERPL-MCNC: 179 MG/DL (ref 65–99)
GLUCOSE SERPL-MCNC: 189 MG/DL (ref 65–99)
GLUCOSE SERPL-MCNC: 92 MG/DL (ref 65–99)
GLUCOSE UR STRIP-MCNC: NEGATIVE MG/DL
GRAM STN SPEC: ABNORMAL
GRAM STN SPEC: ABNORMAL
HCO3 BLDA-SCNC: 23 MMOL/L (ref 20–26)
HCO3 BLDA-SCNC: 32.7 MMOL/L (ref 20–26)
HCT VFR BLD AUTO: 25.7 % (ref 37.5–51)
HCT VFR BLD AUTO: 27.5 % (ref 37.5–51)
HCT VFR BLD AUTO: 27.8 % (ref 37.5–51)
HCT VFR BLD AUTO: 27.9 % (ref 37.5–51)
HCT VFR BLD AUTO: 28.5 % (ref 37.5–51)
HCT VFR BLD AUTO: 30 % (ref 37.5–51)
HCT VFR BLD AUTO: 30.5 % (ref 37.5–51)
HCT VFR BLD AUTO: 31.2 % (ref 37.5–51)
HDLC SERPL-MCNC: 28 MG/DL (ref 40–60)
HGB BLD-MCNC: 7.6 G/DL (ref 13–17.7)
HGB BLD-MCNC: 7.9 G/DL (ref 13–17.7)
HGB BLD-MCNC: 8.3 G/DL (ref 13–17.7)
HGB BLD-MCNC: 8.6 G/DL (ref 13–17.7)
HGB BLD-MCNC: 8.7 G/DL (ref 13–17.7)
HGB BLD-MCNC: 9 G/DL (ref 13–17.7)
HGB BLD-MCNC: 9.5 G/DL (ref 13–17.7)
HGB BLD-MCNC: 9.8 G/DL (ref 13–17.7)
HGB UR QL STRIP.AUTO: NEGATIVE
HOLD SPECIMEN: NORMAL
HOLD SPECIMEN: NORMAL
HYALINE CASTS UR QL AUTO: ABNORMAL /LPF
HYPOCHROMIA BLD QL: ABNORMAL
IMM GRANULOCYTES # BLD AUTO: 0.03 10*3/MM3 (ref 0–0.05)
IMM GRANULOCYTES # BLD AUTO: 0.04 10*3/MM3 (ref 0–0.05)
IMM GRANULOCYTES # BLD AUTO: 0.04 10*3/MM3 (ref 0–0.05)
IMM GRANULOCYTES NFR BLD AUTO: 0.4 % (ref 0–0.5)
IMM GRANULOCYTES NFR BLD AUTO: 0.4 % (ref 0–0.5)
IMM GRANULOCYTES NFR BLD AUTO: 0.5 % (ref 0–0.5)
INHALED O2 CONCENTRATION: 100 %
ISOLATED FROM: ABNORMAL
KETONES UR QL STRIP: NEGATIVE
LAB AP CASE REPORT: NORMAL
LDLC SERPL CALC-MCNC: 79 MG/DL (ref 0–100)
LDLC/HDLC SERPL: 2.74 {RATIO}
LEUKOCYTE ESTERASE UR QL STRIP.AUTO: NEGATIVE
LIPASE SERPL-CCNC: 26 U/L (ref 13–60)
LYMPHOCYTES # BLD AUTO: 0.19 10*3/MM3 (ref 0.7–3.1)
LYMPHOCYTES # BLD AUTO: 0.47 10*3/MM3 (ref 0.7–3.1)
LYMPHOCYTES # BLD AUTO: 0.49 10*3/MM3 (ref 0.7–3.1)
LYMPHOCYTES # BLD MANUAL: 1.06 10*3/MM3 (ref 0.7–3.1)
LYMPHOCYTES NFR BLD AUTO: 2.2 % (ref 19.6–45.3)
LYMPHOCYTES NFR BLD AUTO: 5 % (ref 19.6–45.3)
LYMPHOCYTES NFR BLD AUTO: 6.3 % (ref 19.6–45.3)
LYMPHOCYTES NFR BLD MANUAL: 20.6 % (ref 19.6–45.3)
LYMPHOCYTES NFR BLD MANUAL: 4.1 % (ref 5–12)
Lab: ABNORMAL
Lab: ABNORMAL
Lab: NORMAL
MAGNESIUM SERPL-MCNC: 1.3 MG/DL (ref 1.6–2.4)
MAGNESIUM SERPL-MCNC: 1.5 MG/DL (ref 1.6–2.4)
MAGNESIUM SERPL-MCNC: 1.6 MG/DL (ref 1.6–2.4)
MAGNESIUM SERPL-MCNC: 1.6 MG/DL (ref 1.6–2.4)
MAGNESIUM SERPL-MCNC: 1.7 MG/DL (ref 1.6–2.4)
MAGNESIUM SERPL-MCNC: 1.7 MG/DL (ref 1.6–2.4)
MAXIMAL PREDICTED HEART RATE: 141 BPM
MCH RBC QN AUTO: 22.3 PG (ref 26.6–33)
MCH RBC QN AUTO: 22.3 PG (ref 26.6–33)
MCH RBC QN AUTO: 22.5 PG (ref 26.6–33)
MCH RBC QN AUTO: 22.6 PG (ref 26.6–33)
MCH RBC QN AUTO: 22.6 PG (ref 26.6–33)
MCH RBC QN AUTO: 22.8 PG (ref 26.6–33)
MCHC RBC AUTO-ENTMCNC: 28.7 G/DL (ref 31.5–35.7)
MCHC RBC AUTO-ENTMCNC: 29.6 G/DL (ref 31.5–35.7)
MCHC RBC AUTO-ENTMCNC: 29.9 G/DL (ref 31.5–35.7)
MCHC RBC AUTO-ENTMCNC: 30 G/DL (ref 31.5–35.7)
MCHC RBC AUTO-ENTMCNC: 30.5 G/DL (ref 31.5–35.7)
MCHC RBC AUTO-ENTMCNC: 30.8 G/DL (ref 31.5–35.7)
MCHC RBC AUTO-ENTMCNC: 31.1 G/DL (ref 31.5–35.7)
MCHC RBC AUTO-ENTMCNC: 31.4 G/DL (ref 31.5–35.7)
MCV RBC AUTO: 71.7 FL (ref 79–97)
MCV RBC AUTO: 72.6 FL (ref 79–97)
MCV RBC AUTO: 72.8 FL (ref 79–97)
MCV RBC AUTO: 74 FL (ref 79–97)
MCV RBC AUTO: 74.3 FL (ref 79–97)
MCV RBC AUTO: 74.7 FL (ref 79–97)
MCV RBC AUTO: 76 FL (ref 79–97)
MCV RBC AUTO: 79.3 FL (ref 79–97)
MICROCYTES BLD QL: ABNORMAL
MODALITY: ABNORMAL
MODALITY: ABNORMAL
MONOCYTES # BLD AUTO: 0.21 10*3/MM3 (ref 0.1–0.9)
MONOCYTES # BLD AUTO: 0.62 10*3/MM3 (ref 0.1–0.9)
MONOCYTES # BLD AUTO: 0.7 10*3/MM3 (ref 0.1–0.9)
MONOCYTES # BLD AUTO: 0.73 10*3/MM3 (ref 0.1–0.9)
MONOCYTES NFR BLD AUTO: 7.4 % (ref 5–12)
MONOCYTES NFR BLD AUTO: 8 % (ref 5–12)
MONOCYTES NFR BLD AUTO: 8.6 % (ref 5–12)
NEUTROPHILS # BLD AUTO: 3.65 10*3/MM3 (ref 1.7–7)
NEUTROPHILS NFR BLD AUTO: 6.56 10*3/MM3 (ref 1.7–7)
NEUTROPHILS NFR BLD AUTO: 7.56 10*3/MM3 (ref 1.7–7)
NEUTROPHILS NFR BLD AUTO: 8.24 10*3/MM3 (ref 1.7–7)
NEUTROPHILS NFR BLD AUTO: 85 % (ref 42.7–76)
NEUTROPHILS NFR BLD AUTO: 86.8 % (ref 42.7–76)
NEUTROPHILS NFR BLD AUTO: 88.6 % (ref 42.7–76)
NEUTROPHILS NFR BLD MANUAL: 71.1 % (ref 42.7–76)
NITRITE UR QL STRIP: POSITIVE
NRBC BLD AUTO-RTO: 0 /100 WBC (ref 0–0.2)
NT-PROBNP SERPL-MCNC: 392.4 PG/ML (ref 0–1800)
PATH REPORT.FINAL DX SPEC: NORMAL
PCO2 BLDA: 31.7 MM HG (ref 35–45)
PCO2 BLDA: 49.1 MM HG (ref 35–45)
PH BLDA: 7.43 PH UNITS (ref 7.35–7.45)
PH BLDA: 7.47 PH UNITS (ref 7.35–7.45)
PH UR STRIP.AUTO: 6 [PH] (ref 5–9)
PHOSPHATE SERPL-MCNC: 1.7 MG/DL (ref 2.5–4.5)
PHOSPHATE SERPL-MCNC: 2.4 MG/DL (ref 2.5–4.5)
PHOSPHATE SERPL-MCNC: 5 MG/DL (ref 2.5–4.5)
PLAT MORPH BLD: NORMAL
PLATELET # BLD AUTO: 203 10*3/MM3 (ref 140–450)
PLATELET # BLD AUTO: 211 10*3/MM3 (ref 140–450)
PLATELET # BLD AUTO: 229 10*3/MM3 (ref 140–450)
PLATELET # BLD AUTO: 246 10*3/MM3 (ref 140–450)
PLATELET # BLD AUTO: 254 10*3/MM3 (ref 140–450)
PLATELET # BLD AUTO: 346 10*3/MM3 (ref 140–450)
PLATELET # BLD AUTO: 364 10*3/MM3 (ref 140–450)
PLATELET # BLD AUTO: 403 10*3/MM3 (ref 140–450)
PMV BLD AUTO: 12.2 FL (ref 6–12)
PMV BLD AUTO: 8.8 FL (ref 6–12)
PMV BLD AUTO: 8.8 FL (ref 6–12)
PMV BLD AUTO: 9 FL (ref 6–12)
PMV BLD AUTO: 9.3 FL (ref 6–12)
PMV BLD AUTO: 9.3 FL (ref 6–12)
PMV BLD AUTO: 9.4 FL (ref 6–12)
PMV BLD AUTO: 9.6 FL (ref 6–12)
PO2 BLDA: 122 MM HG (ref 83–108)
PO2 BLDA: 70.5 MM HG (ref 83–108)
POIKILOCYTOSIS BLD QL SMEAR: ABNORMAL
POTASSIUM SERPL-SCNC: 2.6 MMOL/L (ref 3.5–5.2)
POTASSIUM SERPL-SCNC: 3.1 MMOL/L (ref 3.5–5.2)
POTASSIUM SERPL-SCNC: 3.2 MMOL/L (ref 3.5–5.2)
POTASSIUM SERPL-SCNC: 3.4 MMOL/L (ref 3.5–5.2)
POTASSIUM SERPL-SCNC: 3.5 MMOL/L (ref 3.5–5.2)
POTASSIUM SERPL-SCNC: 3.5 MMOL/L (ref 3.5–5.2)
POTASSIUM SERPL-SCNC: 3.6 MMOL/L (ref 3.5–5.2)
PROT SERPL-MCNC: 5.4 G/DL (ref 6–8.5)
PROT SERPL-MCNC: 5.7 G/DL (ref 6–8.5)
PROT SERPL-MCNC: 5.7 G/DL (ref 6–8.5)
PROT SERPL-MCNC: 6 G/DL (ref 6–8.5)
PROT SERPL-MCNC: 7.1 G/DL (ref 6–8.5)
PROT SERPL-MCNC: 7.8 G/DL (ref 6–8.5)
PROT UR QL STRIP: ABNORMAL
QT INTERVAL: 282 MS
QT INTERVAL: 348 MS
QTC INTERVAL: 424 MS
QTC INTERVAL: 446 MS
RBC # BLD AUTO: 3.38 10*6/MM3 (ref 4.14–5.8)
RBC # BLD AUTO: 3.47 10*6/MM3 (ref 4.14–5.8)
RBC # BLD AUTO: 3.72 10*6/MM3 (ref 4.14–5.8)
RBC # BLD AUTO: 3.83 10*6/MM3 (ref 4.14–5.8)
RBC # BLD AUTO: 3.85 10*6/MM3 (ref 4.14–5.8)
RBC # BLD AUTO: 4.04 10*6/MM3 (ref 4.14–5.8)
RBC # BLD AUTO: 4.2 10*6/MM3 (ref 4.14–5.8)
RBC # BLD AUTO: 4.35 10*6/MM3 (ref 4.14–5.8)
RBC # UR: ABNORMAL /HPF
REF LAB TEST METHOD: ABNORMAL
RH BLD: POSITIVE
SAO2 % BLDCOA: 94.8 % (ref 94–99)
SAO2 % BLDCOA: 99.5 % (ref 94–99)
SARS-COV-2 RNA RESP QL NAA+PROBE: NOT DETECTED
SODIUM SERPL-SCNC: 129 MMOL/L (ref 136–145)
SODIUM SERPL-SCNC: 131 MMOL/L (ref 136–145)
SODIUM SERPL-SCNC: 131 MMOL/L (ref 136–145)
SODIUM SERPL-SCNC: 137 MMOL/L (ref 136–145)
SODIUM SERPL-SCNC: 138 MMOL/L (ref 136–145)
SODIUM SERPL-SCNC: 146 MMOL/L (ref 136–145)
SODIUM SERPL-SCNC: 148 MMOL/L (ref 136–145)
SODIUM SERPL-SCNC: 154 MMOL/L (ref 136–145)
SODIUM SERPL-SCNC: 156 MMOL/L (ref 136–145)
SP GR UR STRIP: 1.04 (ref 1–1.03)
SQUAMOUS #/AREA URNS HPF: ABNORMAL /HPF
STRESS TARGET HR: 120 BPM
T&S EXPIRATION DATE: NORMAL
TRIGL SERPL-MCNC: 131 MG/DL (ref 0–150)
TROPONIN T SERPL-MCNC: <0.01 NG/ML (ref 0–0.03)
TROPONIN T SERPL-MCNC: <0.01 NG/ML (ref 0–0.03)
TSH SERPL DL<=0.05 MIU/L-ACNC: 1.23 UIU/ML (ref 0.27–4.2)
UROBILINOGEN UR QL STRIP: ABNORMAL
VENTILATOR MODE: ABNORMAL
VENTILATOR MODE: ABNORMAL
VLDLC SERPL-MCNC: 24 MG/DL (ref 5–40)
WBC # BLD AUTO: 11.01 10*3/MM3 (ref 3.4–10.8)
WBC # BLD AUTO: 12.41 10*3/MM3 (ref 3.4–10.8)
WBC # BLD AUTO: 13.52 10*3/MM3 (ref 3.4–10.8)
WBC # BLD AUTO: 5.13 10*3/MM3 (ref 3.4–10.8)
WBC # BLD AUTO: 7.72 10*3/MM3 (ref 3.4–10.8)
WBC # BLD AUTO: 8.53 10*3/MM3 (ref 3.4–10.8)
WBC # BLD AUTO: 9.49 10*3/MM3 (ref 3.4–10.8)
WBC # BLD AUTO: 9.51 10*3/MM3 (ref 3.4–10.8)
WBC MORPH BLD: NORMAL
WBC UR QL AUTO: ABNORMAL /HPF
WHOLE BLOOD HOLD SPECIMEN: NORMAL
WHOLE BLOOD HOLD SPECIMEN: NORMAL

## 2021-01-01 PROCEDURE — 94799 UNLISTED PULMONARY SVC/PX: CPT

## 2021-01-01 PROCEDURE — 25010000002 MORPHINE PER 10 MG: Performed by: FAMILY MEDICINE

## 2021-01-01 PROCEDURE — 85027 COMPLETE CBC AUTOMATED: CPT | Performed by: SURGERY

## 2021-01-01 PROCEDURE — 25010000002 LEVOFLOXACIN PER 250 MG: Performed by: EMERGENCY MEDICINE

## 2021-01-01 PROCEDURE — 25010000002 HYDROMORPHONE 1 MG/ML SOLUTION: Performed by: SURGERY

## 2021-01-01 PROCEDURE — 84443 ASSAY THYROID STIM HORMONE: CPT

## 2021-01-01 PROCEDURE — 83880 ASSAY OF NATRIURETIC PEPTIDE: CPT | Performed by: INTERNAL MEDICINE

## 2021-01-01 PROCEDURE — 93010 ELECTROCARDIOGRAM REPORT: CPT | Performed by: INTERNAL MEDICINE

## 2021-01-01 PROCEDURE — 87636 SARSCOV2 & INF A&B AMP PRB: CPT | Performed by: EMERGENCY MEDICINE

## 2021-01-01 PROCEDURE — 71045 X-RAY EXAM CHEST 1 VIEW: CPT

## 2021-01-01 PROCEDURE — 80053 COMPREHEN METABOLIC PANEL: CPT | Performed by: SURGERY

## 2021-01-01 PROCEDURE — 82803 BLOOD GASES ANY COMBINATION: CPT

## 2021-01-01 PROCEDURE — 94640 AIRWAY INHALATION TREATMENT: CPT

## 2021-01-01 PROCEDURE — 82150 ASSAY OF AMYLASE: CPT | Performed by: EMERGENCY MEDICINE

## 2021-01-01 PROCEDURE — 82962 GLUCOSE BLOOD TEST: CPT

## 2021-01-01 PROCEDURE — 25010000002 LORAZEPAM PER 2 MG: Performed by: HOSPITALIST

## 2021-01-01 PROCEDURE — 93306 TTE W/DOPPLER COMPLETE: CPT

## 2021-01-01 PROCEDURE — 44970 LAPAROSCOPY APPENDECTOMY: CPT | Performed by: SURGERY

## 2021-01-01 PROCEDURE — 25010000002 MAGNESIUM SULFATE PER 500 MG OF MAGNESIUM: Performed by: SURGERY

## 2021-01-01 PROCEDURE — 25010000002 LORAZEPAM PER 2 MG: Performed by: FAMILY MEDICINE

## 2021-01-01 PROCEDURE — 93005 ELECTROCARDIOGRAM TRACING: CPT | Performed by: EMERGENCY MEDICINE

## 2021-01-01 PROCEDURE — 83605 ASSAY OF LACTIC ACID: CPT | Performed by: EMERGENCY MEDICINE

## 2021-01-01 PROCEDURE — 25010000002 METHYLPREDNISOLONE PER 125 MG: Performed by: INTERNAL MEDICINE

## 2021-01-01 PROCEDURE — 74174 CTA ABD&PLVS W/CONTRAST: CPT

## 2021-01-01 PROCEDURE — 84100 ASSAY OF PHOSPHORUS: CPT | Performed by: SURGERY

## 2021-01-01 PROCEDURE — 99214 OFFICE O/P EST MOD 30 MIN: CPT | Performed by: FAMILY MEDICINE

## 2021-01-01 PROCEDURE — 25010000002 LEVOFLOXACIN PER 250 MG: Performed by: FAMILY MEDICINE

## 2021-01-01 PROCEDURE — 25010000002 CALCIUM GLUCONATE PER 10 ML: Performed by: SURGERY

## 2021-01-01 PROCEDURE — 83690 ASSAY OF LIPASE: CPT | Performed by: EMERGENCY MEDICINE

## 2021-01-01 PROCEDURE — 25010000002 FUROSEMIDE PER 20 MG: Performed by: INTERNAL MEDICINE

## 2021-01-01 PROCEDURE — 80061 LIPID PANEL: CPT

## 2021-01-01 PROCEDURE — 80053 COMPREHEN METABOLIC PANEL: CPT

## 2021-01-01 PROCEDURE — 25010000002 METOCLOPRAMIDE PER 10 MG: Performed by: SURGERY

## 2021-01-01 PROCEDURE — 74018 RADEX ABDOMEN 1 VIEW: CPT

## 2021-01-01 PROCEDURE — 81001 URINALYSIS AUTO W/SCOPE: CPT | Performed by: EMERGENCY MEDICINE

## 2021-01-01 PROCEDURE — 0DTJ4ZZ RESECTION OF APPENDIX, PERCUTANEOUS ENDOSCOPIC APPROACH: ICD-10-PCS | Performed by: SURGERY

## 2021-01-01 PROCEDURE — 83735 ASSAY OF MAGNESIUM: CPT | Performed by: SURGERY

## 2021-01-01 PROCEDURE — 25010000002 LORAZEPAM PER 2 MG

## 2021-01-01 PROCEDURE — 93005 ELECTROCARDIOGRAM TRACING: CPT | Performed by: FAMILY MEDICINE

## 2021-01-01 PROCEDURE — 0 IOPAMIDOL PER 1 ML: Performed by: EMERGENCY MEDICINE

## 2021-01-01 PROCEDURE — 25010000002 LORAZEPAM PER 2 MG: Performed by: INTERNAL MEDICINE

## 2021-01-01 PROCEDURE — 25010000002 POTASSIUM CHLORIDE PER 2 MEQ OF POTASSIUM: Performed by: SURGERY

## 2021-01-01 PROCEDURE — 94760 N-INVAS EAR/PLS OXIMETRY 1: CPT

## 2021-01-01 PROCEDURE — 36600 WITHDRAWAL OF ARTERIAL BLOOD: CPT

## 2021-01-01 PROCEDURE — 86850 RBC ANTIBODY SCREEN: CPT | Performed by: EMERGENCY MEDICINE

## 2021-01-01 PROCEDURE — 25010000002 NEOSTIGMINE 10 MG/10ML SOLUTION: Performed by: NURSE ANESTHETIST, CERTIFIED REGISTERED

## 2021-01-01 PROCEDURE — 86901 BLOOD TYPING SEROLOGIC RH(D): CPT | Performed by: EMERGENCY MEDICINE

## 2021-01-01 PROCEDURE — 93005 ELECTROCARDIOGRAM TRACING: CPT | Performed by: INTERNAL MEDICINE

## 2021-01-01 PROCEDURE — 86580 TB INTRADERMAL TEST: CPT | Performed by: FAMILY MEDICINE

## 2021-01-01 PROCEDURE — 92610 EVALUATE SWALLOWING FUNCTION: CPT | Performed by: SPEECH-LANGUAGE PATHOLOGIST

## 2021-01-01 PROCEDURE — 84484 ASSAY OF TROPONIN QUANT: CPT | Performed by: EMERGENCY MEDICINE

## 2021-01-01 PROCEDURE — 25010000002 AMIODARONE IN DEXTROSE 5% 150-4.21 MG/100ML-% SOLUTION: Performed by: INTERNAL MEDICINE

## 2021-01-01 PROCEDURE — 80053 COMPREHEN METABOLIC PANEL: CPT | Performed by: INTERNAL MEDICINE

## 2021-01-01 PROCEDURE — 85025 COMPLETE CBC W/AUTO DIFF WBC: CPT

## 2021-01-01 PROCEDURE — 25010000002 SUCCINYLCHOLINE PER 20 MG: Performed by: NURSE ANESTHETIST, CERTIFIED REGISTERED

## 2021-01-01 PROCEDURE — 99284 EMERGENCY DEPT VISIT MOD MDM: CPT

## 2021-01-01 PROCEDURE — 25010000002 AMIODARONE IN DEXTROSE 5% 360-4.14 MG/200ML-% SOLUTION: Performed by: INTERNAL MEDICINE

## 2021-01-01 PROCEDURE — 87150 DNA/RNA AMPLIFIED PROBE: CPT | Performed by: EMERGENCY MEDICINE

## 2021-01-01 PROCEDURE — 85025 COMPLETE CBC W/AUTO DIFF WBC: CPT | Performed by: FAMILY MEDICINE

## 2021-01-01 PROCEDURE — 80048 BASIC METABOLIC PNL TOTAL CA: CPT | Performed by: FAMILY MEDICINE

## 2021-01-01 PROCEDURE — C1751 CATH, INF, PER/CENT/MIDLINE: HCPCS

## 2021-01-01 PROCEDURE — 99222 1ST HOSP IP/OBS MODERATE 55: CPT | Performed by: SURGERY

## 2021-01-01 PROCEDURE — 83735 ASSAY OF MAGNESIUM: CPT | Performed by: FAMILY MEDICINE

## 2021-01-01 PROCEDURE — 85007 BL SMEAR W/DIFF WBC COUNT: CPT

## 2021-01-01 PROCEDURE — 25010000002 PROPOFOL 10 MG/ML EMULSION: Performed by: NURSE ANESTHETIST, CERTIFIED REGISTERED

## 2021-01-01 PROCEDURE — 88304 TISSUE EXAM BY PATHOLOGIST: CPT

## 2021-01-01 PROCEDURE — 86900 BLOOD TYPING SEROLOGIC ABO: CPT | Performed by: EMERGENCY MEDICINE

## 2021-01-01 PROCEDURE — 87040 BLOOD CULTURE FOR BACTERIA: CPT | Performed by: EMERGENCY MEDICINE

## 2021-01-01 PROCEDURE — 25010000002 FENTANYL CITRATE (PF) 50 MCG/ML SOLUTION: Performed by: NURSE ANESTHETIST, CERTIFIED REGISTERED

## 2021-01-01 PROCEDURE — 25010000002 ONDANSETRON PER 1 MG: Performed by: NURSE ANESTHETIST, CERTIFIED REGISTERED

## 2021-01-01 PROCEDURE — 87147 CULTURE TYPE IMMUNOLOGIC: CPT | Performed by: EMERGENCY MEDICINE

## 2021-01-01 PROCEDURE — 63710000001 INSULIN ASPART PER 5 UNITS: Performed by: SURGERY

## 2021-01-01 RX ORDER — ROCURONIUM BROMIDE 10 MG/ML
INJECTION, SOLUTION INTRAVENOUS AS NEEDED
Status: DISCONTINUED | OUTPATIENT
Start: 2021-01-01 | End: 2021-01-01 | Stop reason: SURG

## 2021-01-01 RX ORDER — AMLODIPINE BESYLATE 2.5 MG/1
2.5 TABLET ORAL DAILY
Qty: 90 TABLET | Refills: 1 | Status: SHIPPED | OUTPATIENT
Start: 2021-01-01 | End: 2021-01-01 | Stop reason: HOSPADM

## 2021-01-01 RX ORDER — LEVOFLOXACIN 5 MG/ML
500 INJECTION, SOLUTION INTRAVENOUS EVERY 24 HOURS
Status: DISCONTINUED | OUTPATIENT
Start: 2021-01-01 | End: 2021-01-01

## 2021-01-01 RX ORDER — LEVOTHYROXINE SODIUM 0.07 MG/1
75 TABLET ORAL DAILY
Qty: 90 TABLET | Refills: 1 | Status: SHIPPED | OUTPATIENT
Start: 2021-01-01 | End: 2021-01-01 | Stop reason: HOSPADM

## 2021-01-01 RX ORDER — IPRATROPIUM BROMIDE AND ALBUTEROL SULFATE 2.5; .5 MG/3ML; MG/3ML
3 SOLUTION RESPIRATORY (INHALATION)
Status: DISCONTINUED | OUTPATIENT
Start: 2021-01-01 | End: 2021-01-01

## 2021-01-01 RX ORDER — PANTOPRAZOLE SODIUM 40 MG/1
40 TABLET, DELAYED RELEASE ORAL DAILY
Qty: 90 TABLET | Refills: 1 | Status: SHIPPED | OUTPATIENT
Start: 2021-01-01 | End: 2021-01-01 | Stop reason: DRUGHIGH

## 2021-01-01 RX ORDER — ATORVASTATIN CALCIUM 10 MG/1
10 TABLET, FILM COATED ORAL DAILY
Status: DISCONTINUED | OUTPATIENT
Start: 2021-01-01 | End: 2021-01-01

## 2021-01-01 RX ORDER — CLOPIDOGREL BISULFATE 75 MG/1
1 TABLET ORAL DAILY
COMMUNITY
Start: 2021-01-01 | End: 2021-01-01 | Stop reason: SDUPTHER

## 2021-01-01 RX ORDER — ACETAMINOPHEN 650 MG/1
650 SUPPOSITORY RECTAL ONCE AS NEEDED
Status: DISCONTINUED | OUTPATIENT
Start: 2021-01-01 | End: 2021-01-01 | Stop reason: HOSPADM

## 2021-01-01 RX ORDER — ACETAMINOPHEN 325 MG/1
650 TABLET ORAL ONCE AS NEEDED
Status: DISCONTINUED | OUTPATIENT
Start: 2021-01-01 | End: 2021-01-01 | Stop reason: HOSPADM

## 2021-01-01 RX ORDER — LORAZEPAM 2 MG/ML
0.5 INJECTION INTRAMUSCULAR ONCE
Status: COMPLETED | OUTPATIENT
Start: 2021-01-01 | End: 2021-01-01

## 2021-01-01 RX ORDER — ALBUTEROL SULFATE 2.5 MG/3ML
2.5 SOLUTION RESPIRATORY (INHALATION) EVERY 4 HOURS PRN
Status: DISCONTINUED | OUTPATIENT
Start: 2021-01-01 | End: 2021-01-01

## 2021-01-01 RX ORDER — NEOSTIGMINE METHYLSULFATE 1 MG/ML
INJECTION, SOLUTION INTRAVENOUS AS NEEDED
Status: DISCONTINUED | OUTPATIENT
Start: 2021-01-01 | End: 2021-01-01 | Stop reason: SURG

## 2021-01-01 RX ORDER — METOCLOPRAMIDE HYDROCHLORIDE 5 MG/ML
10 INJECTION INTRAMUSCULAR; INTRAVENOUS 3 TIMES DAILY
Status: DISCONTINUED | OUTPATIENT
Start: 2021-01-01 | End: 2021-01-01

## 2021-01-01 RX ORDER — SIMVASTATIN 20 MG
TABLET ORAL
Qty: 90 TABLET | Refills: 0 | Status: SHIPPED | OUTPATIENT
Start: 2021-01-01 | End: 2021-01-01 | Stop reason: SDUPTHER

## 2021-01-01 RX ORDER — SUCCINYLCHOLINE CHLORIDE 20 MG/ML
INJECTION INTRAMUSCULAR; INTRAVENOUS AS NEEDED
Status: DISCONTINUED | OUTPATIENT
Start: 2021-01-01 | End: 2021-01-01 | Stop reason: SURG

## 2021-01-01 RX ORDER — FUROSEMIDE 10 MG/ML
40 INJECTION INTRAMUSCULAR; INTRAVENOUS ONCE
Status: COMPLETED | OUTPATIENT
Start: 2021-01-01 | End: 2021-01-01

## 2021-01-01 RX ORDER — SODIUM CHLORIDE 0.9 % (FLUSH) 0.9 %
10 SYRINGE (ML) INJECTION AS NEEDED
Status: DISCONTINUED | OUTPATIENT
Start: 2021-01-01 | End: 2021-01-01

## 2021-01-01 RX ORDER — DIPHENHYDRAMINE HYDROCHLORIDE 50 MG/ML
12.5 INJECTION INTRAMUSCULAR; INTRAVENOUS
Status: DISCONTINUED | OUTPATIENT
Start: 2021-01-01 | End: 2021-01-01 | Stop reason: HOSPADM

## 2021-01-01 RX ORDER — POTASSIUM CHLORIDE 1.5 G/1.77G
40 POWDER, FOR SOLUTION ORAL ONCE
Status: COMPLETED | OUTPATIENT
Start: 2021-01-01 | End: 2021-01-01

## 2021-01-01 RX ORDER — PANTOPRAZOLE SODIUM 20 MG/1
1 TABLET, DELAYED RELEASE ORAL DAILY
COMMUNITY
Start: 2021-01-01 | End: 2021-01-01 | Stop reason: HOSPADM

## 2021-01-01 RX ORDER — NICOTINE 21 MG/24HR
1 PATCH, TRANSDERMAL 24 HOURS TRANSDERMAL
Status: DISCONTINUED | OUTPATIENT
Start: 2021-01-01 | End: 2021-01-01

## 2021-01-01 RX ORDER — LORAZEPAM 2 MG/ML
INJECTION INTRAMUSCULAR
Status: COMPLETED
Start: 2021-01-01 | End: 2021-01-01

## 2021-01-01 RX ORDER — LEVOFLOXACIN 5 MG/ML
750 INJECTION, SOLUTION INTRAVENOUS ONCE
Status: COMPLETED | OUTPATIENT
Start: 2021-01-01 | End: 2021-01-01

## 2021-01-01 RX ORDER — NALOXONE HCL 0.4 MG/ML
0.4 VIAL (ML) INJECTION
Status: DISCONTINUED | OUTPATIENT
Start: 2021-01-01 | End: 2021-01-01

## 2021-01-01 RX ORDER — LORAZEPAM 2 MG/ML
0.5 INJECTION INTRAMUSCULAR EVERY 6 HOURS PRN
Status: DISCONTINUED | OUTPATIENT
Start: 2021-01-01 | End: 2021-01-01 | Stop reason: SDUPTHER

## 2021-01-01 RX ORDER — METHYLPREDNISOLONE SODIUM SUCCINATE 125 MG/2ML
125 INJECTION, POWDER, LYOPHILIZED, FOR SOLUTION INTRAMUSCULAR; INTRAVENOUS ONCE
Status: COMPLETED | OUTPATIENT
Start: 2021-01-01 | End: 2021-01-01

## 2021-01-01 RX ORDER — ONDANSETRON 2 MG/ML
4 INJECTION INTRAMUSCULAR; INTRAVENOUS EVERY 6 HOURS PRN
Status: DISCONTINUED | OUTPATIENT
Start: 2021-01-01 | End: 2021-01-01

## 2021-01-01 RX ORDER — MEPERIDINE HYDROCHLORIDE 25 MG/ML
12.5 INJECTION INTRAMUSCULAR; INTRAVENOUS; SUBCUTANEOUS
Status: DISCONTINUED | OUTPATIENT
Start: 2021-01-01 | End: 2021-01-01 | Stop reason: HOSPADM

## 2021-01-01 RX ORDER — LEVOTHYROXINE SODIUM 0.07 MG/1
75 TABLET ORAL DAILY
Status: DISCONTINUED | OUTPATIENT
Start: 2021-01-01 | End: 2021-01-01

## 2021-01-01 RX ORDER — LORAZEPAM 2 MG/ML
1 INJECTION INTRAMUSCULAR
Status: DISCONTINUED | OUTPATIENT
Start: 2021-01-01 | End: 2021-01-01

## 2021-01-01 RX ORDER — VARENICLINE TARTRATE 1 MG/1
1 TABLET, FILM COATED ORAL 2 TIMES DAILY
Qty: 56 TABLET | Refills: 1 | Status: SHIPPED | OUTPATIENT
Start: 2021-01-01 | End: 2021-01-01

## 2021-01-01 RX ORDER — BISACODYL 10 MG
10 SUPPOSITORY, RECTAL RECTAL DAILY
Status: DISCONTINUED | OUTPATIENT
Start: 2021-01-01 | End: 2021-01-01

## 2021-01-01 RX ORDER — AMLODIPINE BESYLATE 2.5 MG/1
2.5 TABLET ORAL DAILY
Status: DISCONTINUED | OUTPATIENT
Start: 2021-01-01 | End: 2021-01-01

## 2021-01-01 RX ORDER — MORPHINE SULFATE 2 MG/ML
1 INJECTION, SOLUTION INTRAMUSCULAR; INTRAVENOUS EVERY 4 HOURS PRN
Status: DISCONTINUED | OUTPATIENT
Start: 2021-01-01 | End: 2021-01-01

## 2021-01-01 RX ORDER — POLYETHYLENE GLYCOL 3350 17 G/17G
17 POWDER, FOR SOLUTION ORAL
COMMUNITY
Start: 2021-01-01 | End: 2021-01-01

## 2021-01-01 RX ORDER — LEVOTHYROXINE SODIUM ANHYDROUS 100 UG/5ML
60 INJECTION, POWDER, LYOPHILIZED, FOR SOLUTION INTRAVENOUS
Status: DISCONTINUED | OUTPATIENT
Start: 2021-01-01 | End: 2021-01-01

## 2021-01-01 RX ORDER — PREDNISONE 10 MG/1
TABLET ORAL SEE ADMIN INSTRUCTIONS
COMMUNITY
Start: 2021-01-01 | End: 2021-01-01

## 2021-01-01 RX ORDER — METHOCARBAMOL 500 MG/1
TABLET, FILM COATED ORAL
COMMUNITY
Start: 2021-01-01 | End: 2021-01-01 | Stop reason: HOSPADM

## 2021-01-01 RX ORDER — ONDANSETRON 2 MG/ML
INJECTION INTRAMUSCULAR; INTRAVENOUS AS NEEDED
Status: DISCONTINUED | OUTPATIENT
Start: 2021-01-01 | End: 2021-01-01 | Stop reason: SURG

## 2021-01-01 RX ORDER — PANTOPRAZOLE SODIUM 40 MG/10ML
40 INJECTION, POWDER, LYOPHILIZED, FOR SOLUTION INTRAVENOUS ONCE
Status: COMPLETED | OUTPATIENT
Start: 2021-01-01 | End: 2021-01-01

## 2021-01-01 RX ORDER — LIDOCAINE HYDROCHLORIDE 20 MG/ML
INJECTION, SOLUTION INFILTRATION; PERINEURAL AS NEEDED
Status: DISCONTINUED | OUTPATIENT
Start: 2021-01-01 | End: 2021-01-01 | Stop reason: SURG

## 2021-01-01 RX ORDER — SODIUM CHLORIDE 9 MG/ML
100 INJECTION, SOLUTION INTRAVENOUS CONTINUOUS
Status: DISPENSED | OUTPATIENT
Start: 2021-01-01 | End: 2021-01-01

## 2021-01-01 RX ORDER — ACETAMINOPHEN 500 MG
1000 TABLET ORAL 3 TIMES DAILY
COMMUNITY
Start: 2021-01-01 | End: 2021-01-01

## 2021-01-01 RX ORDER — CLOPIDOGREL BISULFATE 75 MG/1
75 TABLET ORAL DAILY
Qty: 90 TABLET | Refills: 0 | Status: SHIPPED | OUTPATIENT
Start: 2021-01-01 | End: 2021-01-01 | Stop reason: HOSPADM

## 2021-01-01 RX ORDER — SODIUM CHLORIDE 0.9 % (FLUSH) 0.9 %
10 SYRINGE (ML) INJECTION EVERY 12 HOURS SCHEDULED
Status: DISCONTINUED | OUTPATIENT
Start: 2021-01-01 | End: 2021-01-01

## 2021-01-01 RX ORDER — LEVOFLOXACIN 750 MG/1
750 TABLET ORAL DAILY
Qty: 7 TABLET | Refills: 0 | Status: SHIPPED | OUTPATIENT
Start: 2021-01-01 | End: 2021-01-01

## 2021-01-01 RX ORDER — DOXYCYCLINE HYCLATE 100 MG/1
100 TABLET, DELAYED RELEASE ORAL 2 TIMES DAILY
Qty: 20 TABLET | Refills: 0 | Status: SHIPPED | OUTPATIENT
Start: 2021-01-01 | End: 2021-01-01

## 2021-01-01 RX ORDER — SERTRALINE HYDROCHLORIDE 25 MG/1
25 TABLET, FILM COATED ORAL NIGHTLY
Status: DISCONTINUED | OUTPATIENT
Start: 2021-01-01 | End: 2021-01-01

## 2021-01-01 RX ORDER — PROMETHAZINE HYDROCHLORIDE 25 MG/1
25 TABLET ORAL ONCE AS NEEDED
Status: DISCONTINUED | OUTPATIENT
Start: 2021-01-01 | End: 2021-01-01 | Stop reason: HOSPADM

## 2021-01-01 RX ORDER — SERTRALINE HYDROCHLORIDE 25 MG/1
25 TABLET, FILM COATED ORAL NIGHTLY
Qty: 90 TABLET | Refills: 1 | Status: SHIPPED | OUTPATIENT
Start: 2021-01-01 | End: 2021-01-01 | Stop reason: HOSPADM

## 2021-01-01 RX ORDER — SENNA PLUS 8.6 MG/1
2 TABLET ORAL
COMMUNITY
Start: 2021-01-01 | End: 2021-01-01

## 2021-01-01 RX ORDER — HYDROCODONE BITARTRATE AND ACETAMINOPHEN 5; 325 MG/1; MG/1
1 TABLET ORAL EVERY 6 HOURS PRN
Status: DISCONTINUED | OUTPATIENT
Start: 2021-01-01 | End: 2021-01-01

## 2021-01-01 RX ORDER — NICOTINE 21 MG/24HR
PATCH, TRANSDERMAL 24 HOURS TRANSDERMAL
COMMUNITY
Start: 2021-01-01 | End: 2021-01-01

## 2021-01-01 RX ORDER — BUDESONIDE 0.5 MG/2ML
0.5 INHALANT ORAL
Status: DISCONTINUED | OUTPATIENT
Start: 2021-01-01 | End: 2021-01-01

## 2021-01-01 RX ORDER — PANTOPRAZOLE SODIUM 20 MG/1
20 TABLET, DELAYED RELEASE ORAL DAILY
Status: DISCONTINUED | OUTPATIENT
Start: 2021-01-01 | End: 2021-01-01

## 2021-01-01 RX ORDER — SIMETHICONE 80 MG
160 TABLET,CHEWABLE ORAL 3 TIMES DAILY
COMMUNITY
Start: 2021-01-01 | End: 2021-01-01

## 2021-01-01 RX ORDER — PROPOFOL 10 MG/ML
VIAL (ML) INTRAVENOUS AS NEEDED
Status: DISCONTINUED | OUTPATIENT
Start: 2021-01-01 | End: 2021-01-01 | Stop reason: SURG

## 2021-01-01 RX ORDER — FENTANYL CITRATE 50 UG/ML
INJECTION, SOLUTION INTRAMUSCULAR; INTRAVENOUS AS NEEDED
Status: DISCONTINUED | OUTPATIENT
Start: 2021-01-01 | End: 2021-01-01 | Stop reason: SURG

## 2021-01-01 RX ORDER — NALOXONE HCL 0.4 MG/ML
0.4 VIAL (ML) INJECTION AS NEEDED
Status: DISCONTINUED | OUTPATIENT
Start: 2021-01-01 | End: 2021-01-01 | Stop reason: HOSPADM

## 2021-01-01 RX ORDER — EPHEDRINE SULFATE 50 MG/ML
5 INJECTION, SOLUTION INTRAVENOUS ONCE AS NEEDED
Status: DISCONTINUED | OUTPATIENT
Start: 2021-01-01 | End: 2021-01-01 | Stop reason: HOSPADM

## 2021-01-01 RX ORDER — METOPROLOL SUCCINATE 25 MG/1
25 TABLET, EXTENDED RELEASE ORAL DAILY
Qty: 90 TABLET | Refills: 2 | Status: SHIPPED | OUTPATIENT
Start: 2021-01-01 | End: 2021-01-01 | Stop reason: HOSPADM

## 2021-01-01 RX ORDER — SIMVASTATIN 20 MG
20 TABLET ORAL NIGHTLY
Qty: 90 TABLET | Refills: 1 | Status: SHIPPED | OUTPATIENT
Start: 2021-01-01 | End: 2021-01-01 | Stop reason: HOSPADM

## 2021-01-01 RX ORDER — LIDOCAINE HYDROCHLORIDE AND EPINEPHRINE 10; 10 MG/ML; UG/ML
INJECTION, SOLUTION INFILTRATION; PERINEURAL AS NEEDED
Status: DISCONTINUED | OUTPATIENT
Start: 2021-01-01 | End: 2021-01-01 | Stop reason: HOSPADM

## 2021-01-01 RX ORDER — FLUMAZENIL 0.1 MG/ML
0.2 INJECTION INTRAVENOUS AS NEEDED
Status: DISCONTINUED | OUTPATIENT
Start: 2021-01-01 | End: 2021-01-01 | Stop reason: HOSPADM

## 2021-01-01 RX ORDER — ONDANSETRON 2 MG/ML
4 INJECTION INTRAMUSCULAR; INTRAVENOUS ONCE AS NEEDED
Status: DISCONTINUED | OUTPATIENT
Start: 2021-01-01 | End: 2021-01-01 | Stop reason: HOSPADM

## 2021-01-01 RX ORDER — PROMETHAZINE HYDROCHLORIDE 25 MG/1
25 SUPPOSITORY RECTAL ONCE AS NEEDED
Status: DISCONTINUED | OUTPATIENT
Start: 2021-01-01 | End: 2021-01-01 | Stop reason: HOSPADM

## 2021-01-01 RX ORDER — SODIUM CHLORIDE 9 MG/ML
100 INJECTION, SOLUTION INTRAVENOUS CONTINUOUS
Status: DISCONTINUED | OUTPATIENT
Start: 2021-01-01 | End: 2021-01-01

## 2021-01-01 RX ADMIN — ROCURONIUM BROMIDE 10 MG: 50 INJECTION INTRAVENOUS at 03:57

## 2021-01-01 RX ADMIN — LORAZEPAM 1 MG: 2 INJECTION INTRAMUSCULAR; INTRAVENOUS at 21:06

## 2021-01-01 RX ADMIN — SODIUM CHLORIDE, PRESERVATIVE FREE 10 ML: 5 INJECTION INTRAVENOUS at 08:08

## 2021-01-01 RX ADMIN — PROPOFOL 70 MG: 10 INJECTION, EMULSION INTRAVENOUS at 02:57

## 2021-01-01 RX ADMIN — SERTRALINE 25 MG: 25 TABLET, FILM COATED ORAL at 21:06

## 2021-01-01 RX ADMIN — IPRATROPIUM BROMIDE AND ALBUTEROL SULFATE 3 ML: 2.5; .5 SOLUTION RESPIRATORY (INHALATION) at 02:00

## 2021-01-01 RX ADMIN — METOPROLOL TARTRATE 5 MG: 5 INJECTION INTRAVENOUS at 13:21

## 2021-01-01 RX ADMIN — MORPHINE SULFATE 1 MG: 2 INJECTION, SOLUTION INTRAMUSCULAR; INTRAVENOUS at 12:49

## 2021-01-01 RX ADMIN — LORAZEPAM 1 MG: 2 INJECTION INTRAMUSCULAR; INTRAVENOUS at 13:48

## 2021-01-01 RX ADMIN — SODIUM CHLORIDE, PRESERVATIVE FREE 10 ML: 5 INJECTION INTRAVENOUS at 20:16

## 2021-01-01 RX ADMIN — GLYCOPYRROLATE 0.4 MG: 0.2 INJECTION, SOLUTION INTRAMUSCULAR; INTRAVITREAL at 04:21

## 2021-01-01 RX ADMIN — PANTOPRAZOLE SODIUM 20 MG: 20 TABLET, DELAYED RELEASE ORAL at 08:10

## 2021-01-01 RX ADMIN — INSULIN ASPART 4 UNITS: 100 INJECTION, SOLUTION INTRAVENOUS; SUBCUTANEOUS at 17:52

## 2021-01-01 RX ADMIN — SODIUM CHLORIDE 10 MG/HR: 900 INJECTION, SOLUTION INTRAVENOUS at 22:09

## 2021-01-01 RX ADMIN — IPRATROPIUM BROMIDE AND ALBUTEROL SULFATE 3 ML: 2.5; .5 SOLUTION RESPIRATORY (INHALATION) at 06:36

## 2021-01-01 RX ADMIN — SERTRALINE 25 MG: 25 TABLET, FILM COATED ORAL at 21:40

## 2021-01-01 RX ADMIN — NICOTINE 1 PATCH: 21 PATCH, EXTENDED RELEASE TRANSDERMAL at 11:44

## 2021-01-01 RX ADMIN — METOCLOPRAMIDE 10 MG: 5 INJECTION, SOLUTION INTRAMUSCULAR; INTRAVENOUS at 20:40

## 2021-01-01 RX ADMIN — LEVOFLOXACIN 500 MG: 5 INJECTION, SOLUTION INTRAVENOUS at 23:29

## 2021-01-01 RX ADMIN — PANTOPRAZOLE SODIUM 20 MG: 20 TABLET, DELAYED RELEASE ORAL at 09:03

## 2021-01-01 RX ADMIN — LEVOFLOXACIN 500 MG: 5 INJECTION, SOLUTION INTRAVENOUS at 23:48

## 2021-01-01 RX ADMIN — METOPROLOL TARTRATE 5 MG: 5 INJECTION INTRAVENOUS at 10:03

## 2021-01-01 RX ADMIN — ATORVASTATIN CALCIUM 10 MG: 10 TABLET, FILM COATED ORAL at 09:03

## 2021-01-01 RX ADMIN — CALCIUM GLUCONATE: 98 INJECTION, SOLUTION INTRAVENOUS at 20:34

## 2021-01-01 RX ADMIN — PANTOPRAZOLE SODIUM 20 MG: 20 TABLET, DELAYED RELEASE ORAL at 08:52

## 2021-01-01 RX ADMIN — LORAZEPAM 1 MG: 2 INJECTION INTRAMUSCULAR; INTRAVENOUS at 21:15

## 2021-01-01 RX ADMIN — IOPAMIDOL 90 ML: 755 INJECTION, SOLUTION INTRAVENOUS at 23:27

## 2021-01-01 RX ADMIN — METOPROLOL TARTRATE 5 MG: 5 INJECTION INTRAVENOUS at 04:04

## 2021-01-01 RX ADMIN — METRONIDAZOLE 500 MG: 500 INJECTION, SOLUTION INTRAVENOUS at 02:17

## 2021-01-01 RX ADMIN — SODIUM CHLORIDE 15 MG/HR: 900 INJECTION, SOLUTION INTRAVENOUS at 23:29

## 2021-01-01 RX ADMIN — ONDANSETRON 4 MG: 2 INJECTION INTRAMUSCULAR; INTRAVENOUS at 04:05

## 2021-01-01 RX ADMIN — FENTANYL CITRATE 25 MCG: 50 INJECTION INTRAMUSCULAR; INTRAVENOUS at 02:57

## 2021-01-01 RX ADMIN — METOPROLOL TARTRATE 5 MG: 5 INJECTION INTRAVENOUS at 15:52

## 2021-01-01 RX ADMIN — SODIUM CHLORIDE 100 ML/HR: 9 INJECTION, SOLUTION INTRAVENOUS at 15:30

## 2021-01-01 RX ADMIN — I.V. FAT EMULSION 40 G: 20 EMULSION INTRAVENOUS at 17:39

## 2021-01-01 RX ADMIN — LEVOFLOXACIN 500 MG: 5 INJECTION, SOLUTION INTRAVENOUS at 23:46

## 2021-01-01 RX ADMIN — HYDROCODONE BITARTRATE AND ACETAMINOPHEN 1 TABLET: 5; 325 TABLET ORAL at 16:17

## 2021-01-01 RX ADMIN — POTASSIUM CHLORIDE 40 MEQ: 1.5 POWDER, FOR SOLUTION ORAL at 15:29

## 2021-01-01 RX ADMIN — IPRATROPIUM BROMIDE AND ALBUTEROL SULFATE 3 ML: 2.5; .5 SOLUTION RESPIRATORY (INHALATION) at 06:59

## 2021-01-01 RX ADMIN — IPRATROPIUM BROMIDE AND ALBUTEROL SULFATE 3 ML: 2.5; .5 SOLUTION RESPIRATORY (INHALATION) at 10:43

## 2021-01-01 RX ADMIN — METOCLOPRAMIDE 10 MG: 5 INJECTION, SOLUTION INTRAMUSCULAR; INTRAVENOUS at 09:03

## 2021-01-01 RX ADMIN — METRONIDAZOLE 500 MG: 500 INJECTION, SOLUTION INTRAVENOUS at 11:19

## 2021-01-01 RX ADMIN — HYDROCODONE BITARTRATE AND ACETAMINOPHEN 1 TABLET: 5; 325 TABLET ORAL at 09:25

## 2021-01-01 RX ADMIN — METRONIDAZOLE 500 MG: 500 INJECTION, SOLUTION INTRAVENOUS at 16:15

## 2021-01-01 RX ADMIN — METRONIDAZOLE 500 MG: 500 INJECTION, SOLUTION INTRAVENOUS at 08:53

## 2021-01-01 RX ADMIN — SODIUM CHLORIDE 100 ML/HR: 9 INJECTION, SOLUTION INTRAVENOUS at 00:09

## 2021-01-01 RX ADMIN — AMLODIPINE BESYLATE 2.5 MG: 2.5 TABLET ORAL at 08:10

## 2021-01-01 RX ADMIN — AMIODARONE HYDROCHLORIDE 0.5 MG/MIN: 1.8 INJECTION, SOLUTION INTRAVENOUS at 16:14

## 2021-01-01 RX ADMIN — BISACODYL 10 MG: 10 SUPPOSITORY RECTAL at 11:18

## 2021-01-01 RX ADMIN — METRONIDAZOLE 500 MG: 500 INJECTION, SOLUTION INTRAVENOUS at 17:43

## 2021-01-01 RX ADMIN — METOPROLOL TARTRATE 5 MG: 5 INJECTION INTRAVENOUS at 08:19

## 2021-01-01 RX ADMIN — METRONIDAZOLE 500 MG: 500 INJECTION, SOLUTION INTRAVENOUS at 08:11

## 2021-01-01 RX ADMIN — LORAZEPAM 0.5 MG: 2 INJECTION INTRAMUSCULAR; INTRAVENOUS at 00:37

## 2021-01-01 RX ADMIN — METRONIDAZOLE 500 MG: 500 INJECTION, SOLUTION INTRAVENOUS at 16:24

## 2021-01-01 RX ADMIN — LEVOTHYROXINE SODIUM 75 MCG: 75 TABLET ORAL at 09:03

## 2021-01-01 RX ADMIN — HYDROCODONE BITARTRATE AND ACETAMINOPHEN 1 TABLET: 5; 325 TABLET ORAL at 18:36

## 2021-01-01 RX ADMIN — LEVOFLOXACIN 500 MG: 5 INJECTION, SOLUTION INTRAVENOUS at 00:08

## 2021-01-01 RX ADMIN — METOCLOPRAMIDE 10 MG: 5 INJECTION, SOLUTION INTRAMUSCULAR; INTRAVENOUS at 16:11

## 2021-01-01 RX ADMIN — LIDOCAINE HYDROCHLORIDE 50 MG: 20 INJECTION, SOLUTION INFILTRATION; PERINEURAL at 02:57

## 2021-01-01 RX ADMIN — LORAZEPAM 0.5 MG: 2 INJECTION INTRAMUSCULAR; INTRAVENOUS at 13:44

## 2021-01-01 RX ADMIN — LEVOFLOXACIN 750 MG: 5 INJECTION, SOLUTION INTRAVENOUS at 00:38

## 2021-01-01 RX ADMIN — METRONIDAZOLE 500 MG: 500 INJECTION, SOLUTION INTRAVENOUS at 00:53

## 2021-01-01 RX ADMIN — SODIUM CHLORIDE 5 MG/HR: 900 INJECTION, SOLUTION INTRAVENOUS at 21:20

## 2021-01-01 RX ADMIN — NICOTINE 1 PATCH: 21 PATCH, EXTENDED RELEASE TRANSDERMAL at 08:16

## 2021-01-01 RX ADMIN — LORAZEPAM 1 MG: 2 INJECTION INTRAMUSCULAR; INTRAVENOUS at 09:03

## 2021-01-01 RX ADMIN — POTASSIUM CHLORIDE: 2 INJECTION, SOLUTION, CONCENTRATE INTRAVENOUS at 17:39

## 2021-01-01 RX ADMIN — METOCLOPRAMIDE 10 MG: 5 INJECTION, SOLUTION INTRAMUSCULAR; INTRAVENOUS at 08:08

## 2021-01-01 RX ADMIN — SODIUM CHLORIDE 5 MG/HR: 900 INJECTION, SOLUTION INTRAVENOUS at 08:37

## 2021-01-01 RX ADMIN — SUCCINYLCHOLINE CHLORIDE 140 MG: 20 INJECTION, SOLUTION INTRAMUSCULAR; INTRAVENOUS at 02:57

## 2021-01-01 RX ADMIN — METHYLPREDNISOLONE SODIUM SUCCINATE 125 MG: 125 INJECTION, POWDER, FOR SOLUTION INTRAMUSCULAR; INTRAVENOUS at 10:05

## 2021-01-01 RX ADMIN — BISACODYL 10 MG: 10 SUPPOSITORY RECTAL at 09:04

## 2021-01-01 RX ADMIN — METOPROLOL TARTRATE 5 MG: 5 INJECTION INTRAVENOUS at 13:49

## 2021-01-01 RX ADMIN — IPRATROPIUM BROMIDE AND ALBUTEROL SULFATE 3 ML: 2.5; .5 SOLUTION RESPIRATORY (INHALATION) at 19:54

## 2021-01-01 RX ADMIN — SODIUM CHLORIDE 10 MG/HR: 900 INJECTION, SOLUTION INTRAVENOUS at 06:38

## 2021-01-01 RX ADMIN — FUROSEMIDE 40 MG: 10 INJECTION, SOLUTION INTRAMUSCULAR; INTRAVENOUS at 00:49

## 2021-01-01 RX ADMIN — SODIUM CHLORIDE 100 ML/HR: 9 INJECTION, SOLUTION INTRAVENOUS at 12:13

## 2021-01-01 RX ADMIN — SODIUM CHLORIDE 5 MG/HR: 900 INJECTION, SOLUTION INTRAVENOUS at 01:24

## 2021-01-01 RX ADMIN — IPRATROPIUM BROMIDE AND ALBUTEROL SULFATE 3 ML: 2.5; .5 SOLUTION RESPIRATORY (INHALATION) at 19:25

## 2021-01-01 RX ADMIN — METRONIDAZOLE 500 MG: 500 INJECTION, SOLUTION INTRAVENOUS at 16:36

## 2021-01-01 RX ADMIN — POTASSIUM CHLORIDE: 2 INJECTION, SOLUTION, CONCENTRATE INTRAVENOUS at 18:36

## 2021-01-01 RX ADMIN — LORAZEPAM 0.5 MG: 2 INJECTION INTRAMUSCULAR; INTRAVENOUS at 00:08

## 2021-01-01 RX ADMIN — PANTOPRAZOLE SODIUM 40 MG: 40 INJECTION, POWDER, FOR SOLUTION INTRAVENOUS at 23:29

## 2021-01-01 RX ADMIN — HYDROMORPHONE HYDROCHLORIDE 0.5 MG: 1 INJECTION, SOLUTION INTRAMUSCULAR; INTRAVENOUS; SUBCUTANEOUS at 15:42

## 2021-01-01 RX ADMIN — LORAZEPAM 0.5 MG: 2 INJECTION INTRAMUSCULAR; INTRAVENOUS at 02:44

## 2021-01-01 RX ADMIN — METRONIDAZOLE 500 MG: 500 INJECTION, SOLUTION INTRAVENOUS at 00:49

## 2021-01-01 RX ADMIN — LORAZEPAM 0.5 MG: 2 INJECTION INTRAMUSCULAR; INTRAVENOUS at 11:45

## 2021-01-01 RX ADMIN — METRONIDAZOLE 500 MG: 500 INJECTION, SOLUTION INTRAVENOUS at 01:10

## 2021-01-01 RX ADMIN — METRONIDAZOLE 500 MG: 500 INJECTION, SOLUTION INTRAVENOUS at 08:50

## 2021-01-01 RX ADMIN — SODIUM CHLORIDE 15 MG/HR: 900 INJECTION, SOLUTION INTRAVENOUS at 13:45

## 2021-01-01 RX ADMIN — I.V. FAT EMULSION 40 G: 20 EMULSION INTRAVENOUS at 20:36

## 2021-01-01 RX ADMIN — MORPHINE SULFATE 1 MG: 2 INJECTION, SOLUTION INTRAMUSCULAR; INTRAVENOUS at 21:20

## 2021-01-01 RX ADMIN — SODIUM CHLORIDE 100 ML/HR: 900 INJECTION, SOLUTION INTRAVENOUS at 23:29

## 2021-01-01 RX ADMIN — LORAZEPAM 0.5 MG: 2 INJECTION INTRAMUSCULAR; INTRAVENOUS at 02:04

## 2021-01-01 RX ADMIN — ROCURONIUM BROMIDE 20 MG: 50 INJECTION INTRAVENOUS at 03:07

## 2021-01-01 RX ADMIN — HYDROMORPHONE HYDROCHLORIDE 0.5 MG: 1 INJECTION, SOLUTION INTRAMUSCULAR; INTRAVENOUS; SUBCUTANEOUS at 15:52

## 2021-01-01 RX ADMIN — AMIODARONE HYDROCHLORIDE 1 MG/MIN: 1.8 INJECTION, SOLUTION INTRAVENOUS at 20:41

## 2021-01-01 RX ADMIN — LORAZEPAM 0.5 MG: 2 INJECTION INTRAMUSCULAR; INTRAVENOUS at 00:21

## 2021-01-01 RX ADMIN — IPRATROPIUM BROMIDE AND ALBUTEROL SULFATE 3 ML: 2.5; .5 SOLUTION RESPIRATORY (INHALATION) at 08:23

## 2021-01-01 RX ADMIN — IPRATROPIUM BROMIDE AND ALBUTEROL SULFATE 3 ML: 2.5; .5 SOLUTION RESPIRATORY (INHALATION) at 12:05

## 2021-01-01 RX ADMIN — FENTANYL CITRATE 25 MCG: 50 INJECTION INTRAMUSCULAR; INTRAVENOUS at 03:05

## 2021-01-01 RX ADMIN — AMIODARONE HYDROCHLORIDE 0.5 MG/MIN: 1.8 INJECTION, SOLUTION INTRAVENOUS at 15:58

## 2021-01-01 RX ADMIN — HYDROMORPHONE HYDROCHLORIDE 0.5 MG: 1 INJECTION, SOLUTION INTRAMUSCULAR; INTRAVENOUS; SUBCUTANEOUS at 15:18

## 2021-01-01 RX ADMIN — SODIUM CHLORIDE 1000 ML: 9 INJECTION, SOLUTION INTRAVENOUS at 12:48

## 2021-01-01 RX ADMIN — METRONIDAZOLE 500 MG: 500 INJECTION, SOLUTION INTRAVENOUS at 01:24

## 2021-01-01 RX ADMIN — SODIUM CHLORIDE 15 MG/HR: 900 INJECTION, SOLUTION INTRAVENOUS at 16:09

## 2021-01-01 RX ADMIN — LEVOFLOXACIN 500 MG: 5 INJECTION, SOLUTION INTRAVENOUS at 00:12

## 2021-01-01 RX ADMIN — SODIUM CHLORIDE 5 MG/HR: 900 INJECTION, SOLUTION INTRAVENOUS at 14:14

## 2021-01-01 RX ADMIN — SODIUM CHLORIDE, PRESERVATIVE FREE 10 ML: 5 INJECTION INTRAVENOUS at 08:58

## 2021-01-01 RX ADMIN — ATORVASTATIN CALCIUM 10 MG: 10 TABLET, FILM COATED ORAL at 08:10

## 2021-01-01 RX ADMIN — POTASSIUM PHOSPHATE, MONOBASIC AND POTASSIUM PHOSPHATE, DIBASIC 45 MMOL: 224; 236 INJECTION, SOLUTION, CONCENTRATE INTRAVENOUS at 09:02

## 2021-01-01 RX ADMIN — SODIUM CHLORIDE, PRESERVATIVE FREE 10 ML: 5 INJECTION INTRAVENOUS at 21:41

## 2021-01-01 RX ADMIN — LEVOTHYROXINE SODIUM ANHYDROUS 60 MCG: 100 INJECTION, POWDER, LYOPHILIZED, FOR SOLUTION INTRAVENOUS at 10:05

## 2021-01-01 RX ADMIN — SODIUM CHLORIDE, PRESERVATIVE FREE 10 ML: 5 INJECTION INTRAVENOUS at 09:35

## 2021-01-01 RX ADMIN — SODIUM CHLORIDE 100 ML/HR: 9 INJECTION, SOLUTION INTRAVENOUS at 12:51

## 2021-01-01 RX ADMIN — METRONIDAZOLE 500 MG: 500 INJECTION, SOLUTION INTRAVENOUS at 17:07

## 2021-01-01 RX ADMIN — LEVOTHYROXINE SODIUM ANHYDROUS 60 MCG: 100 INJECTION, POWDER, LYOPHILIZED, FOR SOLUTION INTRAVENOUS at 11:19

## 2021-01-01 RX ADMIN — LEVOFLOXACIN 500 MG: 5 INJECTION, SOLUTION INTRAVENOUS at 00:06

## 2021-01-01 RX ADMIN — FUROSEMIDE 40 MG: 10 INJECTION INTRAMUSCULAR; INTRAVENOUS at 10:34

## 2021-01-01 RX ADMIN — LORAZEPAM 0.5 MG: 2 INJECTION INTRAMUSCULAR; INTRAVENOUS at 20:30

## 2021-01-01 RX ADMIN — METOCLOPRAMIDE 10 MG: 5 INJECTION, SOLUTION INTRAMUSCULAR; INTRAVENOUS at 16:24

## 2021-01-01 RX ADMIN — HYDROMORPHONE HYDROCHLORIDE 0.5 MG: 1 INJECTION, SOLUTION INTRAMUSCULAR; INTRAVENOUS; SUBCUTANEOUS at 12:13

## 2021-01-01 RX ADMIN — SERTRALINE 25 MG: 25 TABLET, FILM COATED ORAL at 20:16

## 2021-01-01 RX ADMIN — ATORVASTATIN CALCIUM 10 MG: 10 TABLET, FILM COATED ORAL at 09:04

## 2021-01-01 RX ADMIN — LORAZEPAM 0.5 MG: 2 INJECTION INTRAMUSCULAR; INTRAVENOUS at 19:47

## 2021-01-01 RX ADMIN — LORAZEPAM 0.5 MG: 2 INJECTION INTRAMUSCULAR at 02:04

## 2021-01-01 RX ADMIN — METOCLOPRAMIDE 10 MG: 5 INJECTION, SOLUTION INTRAMUSCULAR; INTRAVENOUS at 21:06

## 2021-01-01 RX ADMIN — SERTRALINE 25 MG: 25 TABLET, FILM COATED ORAL at 20:19

## 2021-01-01 RX ADMIN — LEVOTHYROXINE SODIUM ANHYDROUS 60 MCG: 100 INJECTION, POWDER, LYOPHILIZED, FOR SOLUTION INTRAVENOUS at 11:44

## 2021-01-01 RX ADMIN — MORPHINE SULFATE 1 MG: 2 INJECTION, SOLUTION INTRAMUSCULAR; INTRAVENOUS at 21:06

## 2021-01-01 RX ADMIN — IPRATROPIUM BROMIDE AND ALBUTEROL SULFATE 3 ML: 2.5; .5 SOLUTION RESPIRATORY (INHALATION) at 08:33

## 2021-01-01 RX ADMIN — ATORVASTATIN CALCIUM 10 MG: 10 TABLET, FILM COATED ORAL at 08:52

## 2021-01-01 RX ADMIN — LEVOTHYROXINE SODIUM ANHYDROUS 60 MCG: 100 INJECTION, POWDER, LYOPHILIZED, FOR SOLUTION INTRAVENOUS at 12:05

## 2021-01-01 RX ADMIN — SODIUM CHLORIDE, PRESERVATIVE FREE 10 ML: 5 INJECTION INTRAVENOUS at 20:19

## 2021-01-01 RX ADMIN — LEVOTHYROXINE SODIUM 75 MCG: 75 TABLET ORAL at 08:10

## 2021-01-01 RX ADMIN — HYDROMORPHONE HYDROCHLORIDE 0.5 MG: 1 INJECTION, SOLUTION INTRAMUSCULAR; INTRAVENOUS; SUBCUTANEOUS at 23:25

## 2021-01-01 RX ADMIN — IPRATROPIUM BROMIDE AND ALBUTEROL SULFATE 3 ML: 2.5; .5 SOLUTION RESPIRATORY (INHALATION) at 23:16

## 2021-01-01 RX ADMIN — MORPHINE SULFATE 1 MG: 2 INJECTION, SOLUTION INTRAMUSCULAR; INTRAVENOUS at 10:50

## 2021-01-01 RX ADMIN — SODIUM CHLORIDE, PRESERVATIVE FREE 10 ML: 5 INJECTION INTRAVENOUS at 08:11

## 2021-01-01 RX ADMIN — BISACODYL 10 MG: 10 SUPPOSITORY RECTAL at 08:43

## 2021-01-01 RX ADMIN — AMIODARONE HYDROCHLORIDE 1 MG/MIN: 1.8 INJECTION, SOLUTION INTRAVENOUS at 02:22

## 2021-01-01 RX ADMIN — METRONIDAZOLE 500 MG: 500 INJECTION, SOLUTION INTRAVENOUS at 09:45

## 2021-01-01 RX ADMIN — LEVOTHYROXINE SODIUM ANHYDROUS 60 MCG: 100 INJECTION, POWDER, LYOPHILIZED, FOR SOLUTION INTRAVENOUS at 15:46

## 2021-01-01 RX ADMIN — SODIUM CHLORIDE, PRESERVATIVE FREE 10 ML: 5 INJECTION INTRAVENOUS at 09:53

## 2021-01-01 RX ADMIN — HYDROMORPHONE HYDROCHLORIDE 0.5 MG: 1 INJECTION, SOLUTION INTRAMUSCULAR; INTRAVENOUS; SUBCUTANEOUS at 00:11

## 2021-01-01 RX ADMIN — SODIUM CHLORIDE 100 ML/HR: 9 INJECTION, SOLUTION INTRAVENOUS at 23:48

## 2021-01-01 RX ADMIN — AMIODARONE HYDROCHLORIDE 0.5 MG/MIN: 1.8 INJECTION, SOLUTION INTRAVENOUS at 04:02

## 2021-01-01 RX ADMIN — SODIUM CHLORIDE, PRESERVATIVE FREE 10 ML: 5 INJECTION INTRAVENOUS at 21:06

## 2021-01-01 RX ADMIN — IPRATROPIUM BROMIDE AND ALBUTEROL SULFATE 3 ML: 2.5; .5 SOLUTION RESPIRATORY (INHALATION) at 14:34

## 2021-01-01 RX ADMIN — LORAZEPAM 1 MG: 2 INJECTION INTRAMUSCULAR; INTRAVENOUS at 04:28

## 2021-01-01 RX ADMIN — METRONIDAZOLE 500 MG: 500 INJECTION, SOLUTION INTRAVENOUS at 17:23

## 2021-01-01 RX ADMIN — LORAZEPAM 1 MG: 2 INJECTION INTRAMUSCULAR; INTRAVENOUS at 17:58

## 2021-01-01 RX ADMIN — SODIUM CHLORIDE 15 MG/HR: 900 INJECTION, SOLUTION INTRAVENOUS at 03:28

## 2021-01-01 RX ADMIN — METOPROLOL TARTRATE 5 MG: 5 INJECTION INTRAVENOUS at 16:53

## 2021-01-01 RX ADMIN — LORAZEPAM 1 MG: 2 INJECTION INTRAMUSCULAR; INTRAVENOUS at 08:20

## 2021-01-01 RX ADMIN — AMIODARONE HYDROCHLORIDE 150 MG: 1.5 INJECTION, SOLUTION INTRAVENOUS at 20:31

## 2021-01-01 RX ADMIN — METRONIDAZOLE 500 MG: 500 INJECTION, SOLUTION INTRAVENOUS at 09:06

## 2021-01-01 RX ADMIN — SODIUM CHLORIDE 100 ML/HR: 9 INJECTION, SOLUTION INTRAVENOUS at 06:01

## 2021-01-01 RX ADMIN — MORPHINE SULFATE 1 MG: 2 INJECTION, SOLUTION INTRAMUSCULAR; INTRAVENOUS at 17:50

## 2021-01-01 RX ADMIN — IPRATROPIUM BROMIDE AND ALBUTEROL SULFATE 3 ML: 2.5; .5 SOLUTION RESPIRATORY (INHALATION) at 06:51

## 2021-01-01 RX ADMIN — LORAZEPAM 1 MG: 2 INJECTION INTRAMUSCULAR; INTRAVENOUS at 00:20

## 2021-01-01 RX ADMIN — AMLODIPINE BESYLATE 2.5 MG: 2.5 TABLET ORAL at 09:03

## 2021-01-01 RX ADMIN — LORAZEPAM 0.5 MG: 2 INJECTION INTRAMUSCULAR at 00:37

## 2021-01-01 RX ADMIN — FENTANYL CITRATE 50 MCG: 50 INJECTION INTRAMUSCULAR; INTRAVENOUS at 03:32

## 2021-01-01 RX ADMIN — IPRATROPIUM BROMIDE AND ALBUTEROL SULFATE 3 ML: 2.5; .5 SOLUTION RESPIRATORY (INHALATION) at 20:27

## 2021-01-01 RX ADMIN — METOCLOPRAMIDE 10 MG: 5 INJECTION, SOLUTION INTRAMUSCULAR; INTRAVENOUS at 16:15

## 2021-01-01 RX ADMIN — LORAZEPAM 1 MG: 2 INJECTION INTRAMUSCULAR; INTRAVENOUS at 00:12

## 2021-01-01 RX ADMIN — SODIUM CHLORIDE 100 ML/HR: 900 INJECTION, SOLUTION INTRAVENOUS at 15:43

## 2021-01-01 RX ADMIN — SODIUM CHLORIDE 5 MG/HR: 900 INJECTION, SOLUTION INTRAVENOUS at 08:07

## 2021-01-01 RX ADMIN — METRONIDAZOLE 500 MG: 500 INJECTION, SOLUTION INTRAVENOUS at 20:53

## 2021-01-01 RX ADMIN — METRONIDAZOLE 500 MG: 500 INJECTION, SOLUTION INTRAVENOUS at 08:58

## 2021-01-01 RX ADMIN — HYDROCODONE BITARTRATE AND ACETAMINOPHEN 1 TABLET: 5; 325 TABLET ORAL at 09:15

## 2021-01-01 RX ADMIN — NEOSTIGMINE METHYLSULFATE 3 MG: 1 INJECTION, SOLUTION INTRAVENOUS at 04:21

## 2021-01-01 RX ADMIN — LORAZEPAM 0.5 MG: 2 INJECTION INTRAMUSCULAR; INTRAVENOUS at 20:24

## 2021-01-01 RX ADMIN — LORAZEPAM 0.5 MG: 2 INJECTION INTRAMUSCULAR; INTRAVENOUS at 08:52

## 2021-01-04 NOTE — TELEPHONE ENCOUNTER
[facility] CALL[facility]         REQUESTING A CALL BACK   INSURANCE WILL NOT PAY FOR DELAYED RELEASE OF DOXYCYCLINE AND IS ASKING IF IT CAN BE SUBSTITUTED WITH THE  IMMEDIATE RELEASE           GOOD CONTACT NUMBER   [facility] [address] [place] [address] - [address] [place] [address] [phone] [facility] [phone]   [phone]

## 2021-01-04 NOTE — PROGRESS NOTES
Subjective    Luiz Elise is a 80 y.o. white male smoker, currently down to 1/2 pk/day, COPD, HTN, H/O CAD/MI with 3 stents, A-fib, H/O CVA 2 yrs ago (L sided weakness, impaired swallow), PVD, HTN, GERD, L exotropia( Amblyopia). Recent R Retinopathy receiving eye injections. Hypothyroidism. Chronic anemia, Dementia, and other problems, see PMH/PSH . Pt presents for exam, to discuss acute health problem, Currently being treated for Pneumonia,  Tx and F/U plans.    ' Since last visit, developed SOA, went to [facility] ED twice has pneumonia, COVID test were negative. Some improvement on Antibiotics, but remains weak, easily SOA, worse than base line. Was concerned had another stroke'    Pneumonia  He complains of chest tightness, difficulty breathing, frequent throat clearing, hoarse voice, shortness of breath and sputum production. There is no wheezing. Primary symptoms comments: COPD. This is a new problem. The current episode started 1 to 4 weeks ago. The problem occurs daily. The problem has been gradually improving. The cough is productive of sputum. Associated symptoms include dyspnea on exertion, postnasal drip and sweats. Pertinent negatives include no appetite change, ear pain, rhinorrhea, sneezing or trouble swallowing. His symptoms are aggravated by minimal activity. His symptoms are alleviated by nothing (Antibiotics). He reports minimal improvement on treatment. His past medical history is significant for COPD and pneumonia. Past medical history comments: Recent Pneumonia.   Heart Problem  This is a chronic (CAD/MI, Stents, A-Fib, recent placement of Watchman device. ) problem. The current episode started more than 1 year ago. The problem occurs daily. The problem has been waxing and waning. The symptoms are aggravated by smoking. Treatments tried: Meds, Stenting, Watchman. The treatment provided moderate relief.   Breathing Problem  He complains of chest tightness, difficulty breathing, frequent throat clearing,  hoarse voice, shortness of breath and sputum production. There is no wheezing. Primary symptoms comments: COPD. This is a chronic (Acute LRI, exacerbation of COPD) problem. The current episode started more than 1 year ago. The problem occurs daily. The problem has been gradually worsening. The cough is productive of sputum. Associated symptoms include dyspnea on exertion, postnasal drip and sweats. Pertinent negatives include no appetite change, ear pain, rhinorrhea, sneezing or trouble swallowing. His symptoms are aggravated by exposure to smoke (Pneumonia). His symptoms are alleviated by beta-agonist and ipratropium. He reports moderate improvement on treatment. Risk factors for lung disease include smoking/tobacco exposure. His past medical history is significant for COPD and pneumonia. Past medical history comments: Recent Pneumonia.   Hypertension  This is a chronic problem. The current episode started more than 1 year ago. The problem has been waxing and waning since onset. The problem is controlled. Associated symptoms include palpitations, shortness of breath and sweats. Agents associated with hypertension include thyroid hormones. Risk factors for coronary artery disease include male gender and smoking/tobacco exposure. Past treatments include beta blockers. Current antihypertension treatment includes beta blockers. The current treatment provides significant improvement. Hypertensive end-organ damage includes CVA.   Anemia  Presents for follow-up visit. Symptoms include bruises/bleeds easily, confusion and palpitations. There has been no light-headedness or pallor. Past treatments include nothing.   Memory Loss  This is a chronic (Dementia per wife x 5-7 yrs.) problem. The current episode started more than 1 year ago. The problem occurs daily. The problem has been gradually worsening. The symptoms are aggravated by smoking (CVA, CAD). Treatments tried: Meds, Risk reduction. The treatment provided no relief.    Cerebrovascular Accident  Chronicity: L sided weakness, impaired swallow improved. The current episode started more than 1 year ago (2 yrs ago). The problem occurs daily. The problem has been gradually improving. Treatments tried: PT/ OT. The treatment provided significant relief.        The following portions of the patient's history were reviewed and updated as appropriate: allergies, current medications, past family history, past medical history, past social history, past surgical history and problem list.    Review of Systems   Constitutional: Negative for activity change, appetite change and unexpected weight change.   HENT: Positive for hoarse voice and postnasal drip. Negative for dental problem, drooling, ear discharge, ear pain, facial swelling, hearing loss, mouth sores, nosebleeds, rhinorrhea, sinus pressure, sinus pain, sneezing, tinnitus, trouble swallowing and voice change.         Dentures   Eyes: Positive for visual disturbance. Negative for pain.        R MD   Blind OS    Seen at [facility]     Seen by Dr. Stanton locally   Respiratory: Positive for sputum production and shortness of breath. Negative for apnea, choking, chest tightness, wheezing and stridor.         COPD  Pneumonia, breathing improving.   Cardiovascular: Positive for dyspnea on exertion and palpitations. Negative for leg swelling.         A-fib.    Gastrointestinal: Negative for abdominal distention, anal bleeding, blood in stool and rectal pain.        Bloats since CVA, S/P feeding tube.   Endocrine: Positive for cold intolerance. Negative for heat intolerance, polydipsia, polyphagia and polyuria.   Genitourinary: Positive for difficulty urinating. Negative for decreased urine volume, discharge, enuresis, flank pain, genital sores, penile swelling, scrotal swelling and testicular pain.        Umbilical hernia.    Musculoskeletal: Negative for gait problem and neck stiffness.        Chronic Foot pain   Skin: Negative for color  change, pallor and wound.        Toe nail fungus.     H/O R toe surgery.     Moles on back   Allergic/Immunologic: Positive for environmental allergies. Negative for food allergies and immunocompromised state.   Neurological: Positive for syncope. Negative for tremors, seizures, facial asymmetry, speech difficulty and light-headedness.        L arm jerks.    Hematological: Negative for adenopathy. Bruises/bleeds easily.   Psychiatric/Behavioral: Positive for confusion and hallucinations. Negative for agitation, behavioral problems, decreased concentration, dysphoric mood, self-injury, sleep disturbance and suicidal ideas. The patient is nervous/anxious. The patient is not hyperactive.        Objective   Physical Exam  Vitals signs and nursing note reviewed.   Constitutional:       Appearance: He is ill-appearing.      Comments: Frail, unsteady , SOA   HENT:      Head: Atraumatic.      Right Ear: Tympanic membrane normal.      Left Ear: Tympanic membrane normal.      Nose: No congestion.      Mouth/Throat:      Pharynx: No oropharyngeal exudate or posterior oropharyngeal erythema.   Eyes:      Comments: Blind OS   PRL OD   Neck:      Musculoskeletal: Normal range of motion.   Cardiovascular:      Rate and Rhythm: Normal rate.      Pulses: Normal pulses.      Heart sounds: Murmur present.   Pulmonary:      Effort: No respiratory distress.      Breath sounds: Rhonchi present.   Abdominal:      Palpations: Abdomen is soft.      Hernia: A hernia is present.   Musculoskeletal:      Right lower leg: Edema present.      Left lower leg: Edema present.      Comments: Kyphosis, using walker, unsteady, strength 3-4/5 in ext.    Neurological:      Mental Status: He is oriented to person, place, and time.      Motor: Weakness present.      Gait: Gait abnormal.      Comments: L arm base line strenght was 4/5 now 3/5    Psychiatric:      Comments: Mild-Mod Dementia.          Assessment/Plan   Diagnoses and all orders for this  visit:    1. Pneumonia of right lower lobe due to infectious organism  -     XR Chest PA & Lateral (In Office)  -     doxycycline (DORYX) 100 MG enteric coated tablet; Take 1 tablet by mouth 2 (Two) Times a Day.  Dispense: 20 tablet; Refill: 0    2. History of stroke    3. Chronic bronchitis, unspecified chronic bronchitis type (CMS/HCC)    4. Dementia without behavioral disturbance, unspecified dementia type (CMS/formerly [facility])    5. Iron deficiency anemia due to chronic blood loss    Discussed exam health problems, recent Pneumonia, meds, indications, Tx plan, rationale. Discussed checking CXR, C/W Antibiotics. Discussed past labs, discussed anemia, iron supplement. Discussed F/U with specialist, discussed F/U here.        This document has been electronically signed by Bryce Collazo MD

## 2021-01-06 NOTE — TELEPHONE ENCOUNTER
----- Message from Bryce Collazo MD sent at 1/5/2021 12:58 PM CST -----  CXR here not showing signs of Pneumonia, C/W meds as discussed. Keep me updated.

## 2021-01-09 PROBLEM — J18.9 PNEUMONIA OF RIGHT LOWER LOBE DUE TO INFECTIOUS ORGANISM: Status: ACTIVE | Noted: 2021-01-01

## 2021-01-09 PROBLEM — D50.0 IRON DEFICIENCY ANEMIA DUE TO CHRONIC BLOOD LOSS: Status: ACTIVE | Noted: 2021-01-01

## 2021-02-02 NOTE — PROGRESS NOTES
Subjective   Luiz Elise is a 80 y.o. white male smoker, currently down to 1/2 pk/day, COPD, HTN, H/O CAD/MI with 3 stents, A-fib, H/O CVA 2 yrs ago (L sided weakness, impaired swallow), PVD, HTN, GERD, L exotropia( Amblyopia). Recent R Retinopathy receiving eye injections. Hypothyroidism. Chronic anemia, Dementia, and other problems, see PMH/PSH . Pt presents for exam, to discuss health problem, S/P Tx for Pneumonia,  Tx and F/U plans.   With Wife  'Completed Doxycycline, had F/U CXR. Breathing has improved. Dementia is no better. Have not had labs done'.    Pneumonia  There is no shortness of breath or wheezing. This is a new problem. The current episode started more than 1 month ago. The problem occurs daily. The problem has been resolved. The cough is productive of sputum. Associated symptoms include postnasal drip. Pertinent negatives include no appetite change, ear pain, rhinorrhea, sneezing or trouble swallowing. His symptoms are aggravated by minimal activity. His symptoms are alleviated by nothing (Antibiotics). He reports significant improvement on treatment.   Heart Problem  This is a chronic (CAD/MI, Stents, A-Fib, recent placement of Watchman device. ) problem. The current episode started more than 1 year ago. The problem occurs daily. The problem has been waxing and waning. The symptoms are aggravated by smoking. Treatments tried: Meds, Stenting, Watchman device. The treatment provided moderate relief.   Hypertension  This is a chronic problem. The current episode started more than 1 year ago. The problem has been waxing and waning since onset. The problem is controlled. Associated symptoms include palpitations. Pertinent negatives include no shortness of breath. Agents associated with hypertension include thyroid hormones. Risk factors for coronary artery disease include male gender and smoking/tobacco exposure. Past treatments include beta blockers. Current antihypertension treatment includes beta  blockers. The current treatment provides significant improvement. Hypertensive end-organ damage includes CVA.   Anemia  Presents for follow-up visit. Symptoms include bruises/bleeds easily, confusion and palpitations. There has been no light-headedness or pallor. Past treatments include nothing.   Memory Loss  This is a chronic (Dementia per wife x 5-7 yrs.) problem. The current episode started more than 1 year ago. The problem occurs daily. The problem has been gradually worsening. The symptoms are aggravated by smoking (CVA, CAD). Treatments tried: Meds, Risk reduction. The treatment provided no relief.   Cerebrovascular Accident  Chronicity: L sided weakness, impaired swallow improved. The current episode started more than 1 year ago (2 yrs ago). The problem occurs daily. The problem has been gradually improving. Treatments tried: PT/ OT. The treatment provided significant relief.        The following portions of the patient's history were reviewed and updated as appropriate: allergies, current medications, past family history, past medical history, past social history, past surgical history and problem list.    Review of Systems   Constitutional: Negative for activity change, appetite change and unexpected weight change.   HENT: Positive for postnasal drip. Negative for dental problem, drooling, ear discharge, ear pain, facial swelling, hearing loss, mouth sores, nosebleeds, rhinorrhea, sinus pressure, sinus pain, sneezing, tinnitus, trouble swallowing and voice change.         Dentures   Eyes: Positive for visual disturbance. Negative for pain.        R MD   Blind OS    Seen at [facility]     Seen by Dr. Stanton locally   Respiratory: Negative for apnea, choking, chest tightness, shortness of breath, wheezing and stridor.         COPD  Pneumonia resolved   Cardiovascular: Positive for palpitations. Negative for leg swelling.         A-fib.    Gastrointestinal: Negative for abdominal distention, anal bleeding,  blood in stool and rectal pain.        Bloats since CVA, S/P feeding tube.   Endocrine: Positive for cold intolerance. Negative for heat intolerance, polydipsia, polyphagia and polyuria.   Genitourinary: Positive for difficulty urinating. Negative for decreased urine volume, discharge, enuresis, flank pain, genital sores, penile swelling, scrotal swelling and testicular pain.        Umbilical hernia.    Musculoskeletal: Negative for gait problem and neck stiffness.        Chronic Foot pain   Skin: Negative for color change, pallor and wound.        Toe nail fungus.     H/O R toe surgery.     Moles on back   Allergic/Immunologic: Positive for environmental allergies. Negative for food allergies and immunocompromised state.   Neurological: Positive for syncope. Negative for tremors, seizures, facial asymmetry, speech difficulty and light-headedness.        L arm jerks.    Hematological: Negative for adenopathy. Bruises/bleeds easily.   Psychiatric/Behavioral: Positive for confusion and hallucinations. Negative for agitation, behavioral problems, decreased concentration, dysphoric mood, self-injury, sleep disturbance and suicidal ideas. The patient is nervous/anxious. The patient is not hyperactive.        Objective   Physical Exam  Vitals signs and nursing note reviewed.   Constitutional:       Appearance: He is not ill-appearing.   HENT:      Head: Atraumatic.      Right Ear: Tympanic membrane normal.      Left Ear: Tympanic membrane normal.      Nose: No congestion.      Mouth/Throat:      Pharynx: No oropharyngeal exudate or posterior oropharyngeal erythema.   Eyes:      Comments: Blind OS   PRL OD   Neck:      Musculoskeletal: Normal range of motion.   Cardiovascular:      Rate and Rhythm: Normal rate.      Pulses: Normal pulses.      Heart sounds: Murmur present.   Pulmonary:      Effort: Pulmonary effort is normal. No respiratory distress.      Breath sounds: Normal breath sounds. No rhonchi.   Abdominal:       General: There is distension.      Palpations: There is no mass.      Hernia: A hernia is present.      Comments: Bloated.   Musculoskeletal:      Right lower leg: Edema present.      Left lower leg: Edema present.      Comments: Kyphosis, using walker, unsteady, strength 3-4/5 in ext.    Neurological:      Mental Status: He is oriented to person, place, and time.      Motor: Weakness present.      Gait: Gait abnormal.      Comments: L arm base line strenght was 4/5 now 3/5    Psychiatric:         Mood and Affect: Mood normal.         Behavior: Behavior normal.         Thought Content: Thought content normal.      Comments: Mild-Mod Dementia.          Assessment/Plan   Diagnoses and all orders for this visit:    1. Hypothyroidism, unspecified type  -     Comprehensive metabolic panel; Future  -     TSH; Future    2. Long term (current) use of anticoagulants  -     CBC & Differential; Future    3. Chronic bronchitis, unspecified chronic bronchitis type (CMS/HCC)    4. Pneumonia of right lower lobe due to infectious organism    5. Iron deficiency anemia due to chronic blood loss  -     CBC & Differential; Future  -     Comprehensive metabolic panel; Future    6. Smoker      Discussed exam, health problems, medications indications treatment plan rationale.  Reviewed chest x-ray with patient, pneumonia resolved.  Discussed smoking cessation x3 minutes.  Discussed fall precautions.  Discussed checking routine labs.        This document has been electronically signed by Bryce Collazo MD on February 2, 2021

## 2021-02-05 NOTE — TELEPHONE ENCOUNTER
----- Message from Bryce Collazo MD sent at 2/5/2021  8:51 AM CST -----  Potassium is low , can take OTC Potassium gluconate. 2 pills daily. Anemia is worse should be taking iron supplement. Will go over labs at next visit.

## 2021-02-17 NOTE — TELEPHONE ENCOUNTER
Left voice message for pt wife antibiotic was sent in and pt should come get chest x-ray to check for pneumonia.  HUB to read

## 2021-02-17 NOTE — TELEPHONE ENCOUNTER
Called in stating that the patient is having trouble breathing while he is laying down and a constent pain on the right side of his chest that is sore to touch.This has been going on for most of this week and getting worse.

## 2021-02-17 NOTE — TELEPHONE ENCOUNTER
Will send antibiotic in to [facility] . Placed order for CXR to see if Pneumonia has come back.

## 2021-02-18 NOTE — TELEPHONE ENCOUNTER
Spoke with pt wife to give x-ray results. Would like to know if pt has to continue the antibiotic. Also would like to know what could be causing his problem if chest xray was okay.   Please advise.

## 2021-02-18 NOTE — TELEPHONE ENCOUNTER
Caller: Katarina Elise    Relationship to patient: Emergency Contact    Best call back number: [phone]     Patient is needing: would like xray results

## 2021-02-18 NOTE — TELEPHONE ENCOUNTER
Would continue antibiotic, x-ray can be normal, and be developing another resp infection. If get worse would need to go for ED eval might need to have a CT done. Hope he starts to feel better.

## 2021-03-24 NOTE — PROGRESS NOTES
Subjective    Luiz Elise is a 79 y.o. white male smoker, currently down to 1/2 pk/day, COPD, HTN, H/O CAD/MI with 3 stents, A-fib, H/O CVA 2 yrs ago (L sided weakness, impaired swallow), PVD, HTN, GERD, L exotropia( Amblyopia). Recent R Retinopathy receiving eye injections. Hypothyroidism. Chronic anemia, Dementia, and other problems, see PMH/PSH . Pt presents for exam, to discuss acute health problems, Tx and F/U plans.     ' Congested, coughing  can't breath thru nose x 5 days. Taking benadryl, cold med, OTC cough meds, cough drops. Feeling Feverish, concerned not to get pneumonia'    Heart Problem   This is a chronic (CAD/MI, Stents, A-Fib) problem. The current episode started more than 1 year ago. The problem occurs daily. The problem has been waxing and waning. Associated symptoms include arthralgias, chest pain, coughing, fatigue and myalgias. Pertinent negatives include no chills, congestion, diaphoresis, joint swelling, neck pain, rash, sore throat or weakness. The symptoms are aggravated by smoking. Treatments tried: Meds, Stenting. The treatment provided moderate relief.   Breathing Problem   He complains of cough, difficulty breathing, frequent throat clearing, hoarse voice, shortness of breath and sputum production. There is no wheezing. Primary symptoms comments: COPD. This is a chronic (Acute LRI, exacerbation of COPD) problem. The current episode started more than 1 year ago. The problem occurs daily. The problem has been gradually worsening. The cough is productive of sputum. Associated symptoms include chest pain, myalgias and postnasal drip. Pertinent negatives include no appetite change, ear pain, rhinorrhea, sneezing, sore throat or trouble swallowing. His symptoms are aggravated by exposure to smoke. His symptoms are alleviated by beta-agonist and ipratropium. He reports moderate improvement on treatment. Risk factors for lung disease include smoking/tobacco exposure. His past medical history is  significant for COPD.   Hypothyroidism   This is a chronic problem. The current episode started more than 1 year ago. The problem occurs daily. The problem has been waxing and waning. Associated symptoms include arthralgias, chest pain, coughing, fatigue and myalgias. Pertinent negatives include no chills, congestion, diaphoresis, joint swelling, neck pain, rash, sore throat or weakness. Treatments tried: Levothyroxine. The treatment provided moderate relief.   Hypertension   This is a chronic problem. The current episode started more than 1 year ago. The problem has been waxing and waning since onset. The problem is controlled. Associated symptoms include chest pain, palpitations and shortness of breath. Pertinent negatives include no neck pain. Agents associated with hypertension include thyroid hormones. Risk factors for coronary artery disease include male gender and smoking/tobacco exposure. Past treatments include beta blockers. Current antihypertension treatment includes beta blockers. The current treatment provides significant improvement. Hypertensive end-organ damage includes CVA.   Anemia   Presents for initial visit. Symptoms include bruises/bleeds easily, confusion and palpitations. There has been no light-headedness or pallor. Past treatments include nothing. Past medical history includes hypothyroidism.   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 This is a chronic problem. The problem occurs daily. The problem has been unchanged. Pertinent negatives include no eye discharge, eye redness, itching, photophobia or weakness. Treatments tried: Injections. The treatment provided mild relief.   Memory Loss   This is a chronic (Dementia per wife x 5-7 yrs.) problem. The current episode started more than 1 year ago. The problem occurs daily. The problem has been gradually worsening. Associated symptoms include arthralgias, chest pain, coughing, fatigue and myalgias. Pertinent  negatives include no chills, congestion, diaphoresis, joint swelling, neck pain, rash, sore throat or weakness. The symptoms are aggravated by smoking (CVA, CAD). Treatments tried: Meds, Risk reduction. The treatment provided no relief.   Cerebrovascular Accident   Chronicity: L sided weakness, impaired swallow. The current episode started more than 1 year ago (2 yrs ago). The problem occurs daily. The problem has been gradually improving. Associated symptoms include arthralgias, chest pain, coughing, fatigue and myalgias. Pertinent negatives include no chills, congestion, diaphoresis, joint swelling, neck pain, rash, sore throat or weakness. Treatments tried: PT/ OT. The treatment provided significant relief.   GI Problem   The primary symptoms include fatigue, myalgias and arthralgias. Primary symptoms do not include diarrhea, dysuria or rash. Primary symptoms comment: Dysphagia S/P CVA. Gastrostomy tube..   The myalgias are not associated with weakness.   The illness is also significant for back pain. The illness does not include chills or constipation.        The following portions of the patient's history were reviewed and updated as appropriate: allergies, current medications, past family history, past medical history, past social history, past surgical history and problem list.    Review of Systems   Constitutional: Positive for fatigue. Negative for activity change, appetite change, chills, diaphoresis and unexpected weight change.   HENT: Positive for hoarse voice and postnasal drip. Negative for congestion, dental problem, drooling, ear discharge, ear pain, facial swelling, hearing loss, mouth sores, nosebleeds, rhinorrhea, sinus pressure, sinus pain, sneezing, sore throat, tinnitus, trouble swallowing and voice change.         Dentures   Eyes: Positive for visual disturbance. Negative for photophobia, pain, discharge, redness and itching.        R MD   Blind OS    Seen at [facility]     Seen by   [place] locally   Respiratory: Positive for cough, sputum production and shortness of breath. Negative for apnea, choking, chest tightness, wheezing and stridor.         COPD  Resp infection, breathing worsening.   Cardiovascular: Positive for chest pain and palpitations. Negative for leg swelling.         A-fib.    Gastrointestinal: Negative for abdominal distention, anal bleeding, blood in stool, constipation, diarrhea and rectal pain.        Bloats since CVA, S/P feeding tube.   Endocrine: Positive for cold intolerance. Negative for heat intolerance, polydipsia, polyphagia and polyuria.   Genitourinary: Positive for difficulty urinating. Negative for decreased urine volume, discharge, dysuria, enuresis, flank pain, frequency, genital sores, penile pain, penile swelling, scrotal swelling, testicular pain and urgency.        Umbilical hernia.    Musculoskeletal: Positive for arthralgias, back pain and myalgias. Negative for gait problem, joint swelling, neck pain and neck stiffness.        Chronic Foot pain   Skin: Negative for color change, pallor, rash and wound.        Toe nail fungus.     H/O R toe surgery.     Moles on back   Allergic/Immunologic: Positive for environmental allergies. Negative for food allergies and immunocompromised state.   Neurological: Positive for dizziness and syncope. Negative for tremors, seizures, facial asymmetry, speech difficulty, weakness and light-headedness.        L arm jerks.    Hematological: Negative for adenopathy. Bruises/bleeds easily.   Psychiatric/Behavioral: Positive for confusion and hallucinations. Negative for agitation, behavioral problems, decreased concentration, dysphoric mood, self-injury, sleep disturbance and suicidal ideas. The patient is nervous/anxious. The patient is not hyperactive.        Objective   Physical Exam   Constitutional: He is oriented to person, place, and time. He appears well-developed and well-nourished. No distress.   HENT:   Head:  Normocephalic.   Right Ear: External ear normal.   Left Ear: External ear normal.   Nose: Nose normal.   Mouth/Throat: Oropharynx is clear and moist. No oropharyngeal exudate.   Eyes: Conjunctivae are normal. Right eye exhibits no discharge. Left eye exhibits no discharge. No scleral icterus.   Blind out turning L eye.     R eye MI PRL.    Neck: No JVD present. No tracheal deviation present. No thyromegaly present.   Cardiovascular: Normal rate and normal heart sounds. Exam reveals no gallop and no friction rub.   No murmur heard.  Pulmonary/Chest: Effort normal. No stridor. No respiratory distress. He has wheezes. He has no rales. He exhibits no tenderness.   Coarse BBS   Productive cough tan secretions.   Abdominal: Bowel sounds are normal. He exhibits no distension and no mass. There is no tenderness. There is no rebound and no guarding. A hernia is present.   Has 50 mm reducible umbilical hernia.    Musculoskeletal: Normal range of motion. He exhibits no edema, tenderness or deformity.   Lymphadenopathy:     He has no cervical adenopathy.   Neurological: He is alert and oriented to person, place, and time. He has normal reflexes. He displays normal reflexes. A cranial nerve deficit and sensory deficit is present. He exhibits normal muscle tone. Coordination normal.   L arm strenght 4/5.   Romberg +  Recog 3/3   Recall 1/3     Skin: Skin is warm and dry. Capillary refill takes 2 to 3 seconds. No rash noted. He is not diaphoretic. No erythema. No pallor.   Psychiatric: His behavior is normal.   Affect flat  Mood congruent.    Nursing note and vitals reviewed.      Assessment/Plan   Luiz was seen today for hypertension, hypothyroidism, anemia, sinusitis and cough.    Diagnoses and all orders for this visit:    Lower respiratory infection (e.g., bronchitis, pneumonia, pneumonitis, pulmonitis)  -     triamcinolone acetonide (KENALOG-40) injection 40 mg  -     cefTRIAXone (ROCEPHIN) injection 1 g    Long term  (current) use of anticoagulants    History of stroke    Dementia without behavioral disturbance, unspecified dementia type (CMS/HCC)    Other orders  -     pantoprazole (PROTONIX) 40 MG EC tablet; Take 1 tablet by mouth Daily.  -     sertraline (ZOLOFT) 25 MG tablet; Take 1 tablet by mouth Every Night.  -     Discontinue: azithromycin (ZITHROMAX) 250 MG tablet; Take 2 tablets the first day, then 1 tablet daily for 4 days.  -     Discontinue: brompheniramine-pseudoephedrine-DM 30-2-10 MG/5ML syrup; Take 5 mL by mouth 3 (Three) Times a Day As Needed for Congestion or Cough.  -     azithromycin (ZITHROMAX) 250 MG tablet; Take 2 tablets the first day, then 1 tablet daily for 4 days.  -     brompheniramine-pseudoephedrine-DM 30-2-10 MG/5ML syrup; Take 5 mL by mouth 3 (Three) Times a Day As Needed for Congestion or Cough.    Wife in with Patient.    Discussed exam, health problems,  meds, indications, Tx plan, rationale. Discussed F/U plan.          This document has been electronically signed by Bryce Collazo MD                Detail Level: Zone Moisturizer Recommendations: Cerave moisturizer OTC Detail Level: Simple Detail Level: Generalized

## 2021-04-14 NOTE — TELEPHONE ENCOUNTER
Wife returning missed call about Luiz's XR results.    [phone] is wife's number to call back.

## 2021-04-14 NOTE — TELEPHONE ENCOUNTER
----- Message from Bryce Collazo MD sent at 4/14/2021  1:43 PM CDT -----  X-ray doesn't show any broken bone, ankle is sprained.

## 2021-04-17 PROBLEM — G45.9 TIA (TRANSIENT ISCHEMIC ATTACK): Status: ACTIVE | Noted: 2021-01-01
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
## 2021-04-17 PROBLEM — M25.572 ACUTE LEFT ANKLE PAIN: Status: ACTIVE | Noted: 2021-01-01

## 2021-04-17 PROBLEM — M79.672 LEFT FOOT PAIN: Status: ACTIVE | Noted: 2021-01-01

## 2021-04-17 NOTE — PROGRESS NOTES
Chief Complaint  Dizziness (follow up from [facility]), Weakness - Generalized, Edema, and Pneumonia    Subjective          Luiz Elise presents to [facility]  Dizziness  This is a recurrent problem. The current episode started more than 1 month ago. The problem occurs intermittently. The problem has been waxing and waning. Associated symptoms include coughing. He has tried rest (exercise) for the symptoms. The treatment provided no relief.   Weakness - Generalized  This is a chronic problem. The current episode started more than 1 year ago. The problem occurs daily. The problem has been gradually worsening. Associated symptoms include coughing. Associated symptoms comments: Falls. Exacerbated by: Illness, Pneumonia, Dementia. Treatments tried: Hosp admit, Antibiotics.  The treatment provided mild relief.   Pneumonia  He complains of chest tightness, cough, difficulty breathing, sputum production and wheezing. There is no shortness of breath. This is a recurrent problem. The current episode started more than 1 month ago. The problem occurs intermittently. The problem has been waxing and waning. The cough is productive of sputum. Associated symptoms include postnasal drip. Pertinent negatives include no appetite change, ear pain, rhinorrhea, sneezing or trouble swallowing. His symptoms are aggravated by minimal activity. His symptoms are alleviated by beta-agonist, oral steroids, steroid inhaler and prescription cough suppressant (Antibiotics). He reports moderate improvement on treatment. His past medical history is significant for COPD and pneumonia.   Heart Problem  This is a chronic (CAD/MI, Stents, A-Fib, recent placement of Watchman device. ) problem. The current episode started more than 1 year ago. The problem occurs daily. The problem has been waxing and waning. Associated symptoms include coughing. The symptoms are aggravated by smoking. Treatments tried: Meds, Stenting, Watchman  device. The treatment provided moderate relief.   Hypertension  This is a chronic problem. The current episode started more than 1 year ago. The problem has been waxing and waning since onset. The problem is controlled. Associated symptoms include palpitations. Pertinent negatives include no shortness of breath. Agents associated with hypertension include thyroid hormones. Risk factors for coronary artery disease include male gender and smoking/tobacco exposure. Past treatments include beta blockers. Current antihypertension treatment includes beta blockers. The current treatment provides significant improvement. Hypertensive end-organ damage includes CVA.   Anemia  Presents for follow-up visit. Symptoms include bruises/bleeds easily, confusion and palpitations. There has been no light-headedness or pallor. Past treatments include nothing.   Memory Loss  This is a chronic (Dementia per wife x 5-7 yrs.) problem. The current episode started more than 1 year ago. The problem occurs daily. The problem has been gradually worsening. Associated symptoms include coughing. The symptoms are aggravated by smoking (CVA, CAD). Treatments tried: Meds, Risk reduction. The treatment provided no relief.   Cerebrovascular Accident  This is a chronic (L sided weakness, impaired swallow improved.) problem. The current episode started more than 1 year ago (2 yrs ago). The problem occurs daily. The problem has been gradually improving. Associated symptoms include coughing. Exacerbated by: Cerebral small vessel disease. Treatments tried: PT/ OT. The treatment provided significant relief.   Foot Injury   The incident occurred 5 to 7 days ago. The incident occurred at home. The injury mechanism was a fall. The pain is present in the right ankle and right foot. The pain is at a severity of 5/10. The pain is moderate. The pain has been improving since onset. He has tried acetaminophen and non-weight bearing for the symptoms. The treatment  "provided mild relief.       Objective   Vital Signs:   /60 (BP Location: Right arm, Patient Position: Sitting, Cuff Size: Adult)   Pulse 75   Temp 98 °F (36.7 °C) (Temporal)   Ht 175.3 cm (69\")   Wt 87.5 kg (193 lb)   SpO2 97%   BMI 28.50 kg/m²     Physical Exam  Vitals and nursing note reviewed.   Constitutional:       Appearance: He is ill-appearing.   HENT:      Head: Atraumatic.      Right Ear: Tympanic membrane, ear canal and external ear normal. There is no impacted cerumen.      Left Ear: Tympanic membrane and ear canal normal. There is no impacted cerumen.      Nose: No congestion.      Mouth/Throat:      Pharynx: No oropharyngeal exudate or posterior oropharyngeal erythema.   Eyes:      Comments: Blind OS   PRL OD   Cardiovascular:      Rate and Rhythm: Normal rate.      Pulses: Normal pulses.      Heart sounds: Murmur heard.     Pulmonary:      Effort: Pulmonary effort is normal. No respiratory distress.      Breath sounds: Normal breath sounds. No rhonchi.   Abdominal:      General: There is distension.      Palpations: There is no mass.      Hernia: A hernia is present.      Comments: Bloated.   Musculoskeletal:         General: Swelling and tenderness present.      Cervical back: Normal range of motion.      Right lower leg: Edema present.      Left lower leg: Edema present.      Comments: Kyphosis, using walker, unsteady, strength 3-4/5 in ext.     L ankle and medial ankle swollen , tenderness across top of foot and ankle    Neurological:      Mental Status: He is oriented to person, place, and time.      Cranial Nerves: No cranial nerve deficit.      Sensory: No sensory deficit.      Motor: Weakness present.      Gait: Gait abnormal.      Comments: L arm base line strenght was 4/5 now 3/5    Psychiatric:         Mood and Affect: Mood normal.         Behavior: Behavior normal.         Thought Content: Thought content normal.      Comments: Mild-Mod Dementia.         Result Review :          "          Assessment and Plan    Diagnoses and all orders for this visit:    1. Acute left ankle pain  -     XR Foot 3+ View Left (In Office)  -     Diclofenac Sodium (Voltaren) 1 % gel gel; Apply 4 g topically to the appropriate area as directed 4 (Four) Times a Day.  Dispense: 150 g; Refill: 3  -     XR Ankle 3+ View Left (In Office)    2. Fall, initial encounter  -     XR Foot 3+ View Left (In Office)  -     XR Ankle 3+ View Left (In Office)    3. TIA (transient ischemic attack)    4. Left foot pain  -     Diclofenac Sodium (Voltaren) 1 % gel gel; Apply 4 g topically to the appropriate area as directed 4 (Four) Times a Day.  Dispense: 150 g; Refill: 3  -     XR Ankle 3+ View Left (In Office)    5. Pneumonia due to infectious organism, unspecified laterality, unspecified part of lung  Comments:   Admitted to [facility] with recurrent pneumonia.    6. Chronic bronchitis, unspecified chronic bronchitis type (CMS/HCC)  Comments:  recovering exacerbation COPD, LRI    7. Hypothyroidism, unspecified type  -     levothyroxine (SYNTHROID, LEVOTHROID) 75 MCG tablet; Take 1 tablet by mouth Daily.  Dispense: 90 tablet; Refill: 1    8. GERD without esophagitis  -     pantoprazole (PROTONIX) 40 MG EC tablet; Take 1 tablet by mouth Daily.  Dispense: 90 tablet; Refill: 1    9. Dementia without behavioral disturbance, unspecified dementia type (CMS/HCC)  -     sertraline (ZOLOFT) 25 MG tablet; Take 1 tablet by mouth Every Night.  Dispense: 90 tablet; Refill: 1    10. Recurrent major depressive disorder, in partial remission (CMS/[facility])  -     sertraline (ZOLOFT) 25 MG tablet; Take 1 tablet by mouth Every Night.  Dispense: 90 tablet; Refill: 1    11. Essential hypertension  -     metoprolol succinate XL (TOPROL-XL) 25 MG 24 hr tablet; Take 1 tablet by mouth Daily.  Dispense: 90 tablet; Refill: 2  -     amLODIPine (NORVASC) 2.5 MG tablet; Take 1 tablet by mouth Daily.  Dispense: 90 tablet; Refill: 1    12. History of stroke  -      simvastatin (ZOCOR) 20 MG tablet; Take 1 tablet by mouth Every Night.  Dispense: 90 tablet; Refill: 1    13. Hyperlipidemia LDL goal <70  -     simvastatin (ZOCOR) 20 MG tablet; Take 1 tablet by mouth Every Night.  Dispense: 90 tablet; Refill: 1        Follow Up   Return in about 4 weeks (around 5/11/2021).  Patient was given instructions and counseling regarding his condition or for health maintenance advice. Please see specific information pulled into the AVS if appropriate.         This document has been electronically signed by Bryce Collazo MD

## 2021-05-10 NOTE — PROGRESS NOTES
Chief Complaint  Ankle Pain, Foot Pain, Pneumonia, Atrial Fibrillation, Hypothyroidism, Cough, Sinusitis, and Chest Pain    Subjective          Luiz Elise presents to [facility]  'Ready to stop smoking would like to try Chantix, tried Nicotine and Wellbutrin in past, didn't work'    Dizziness  This is a recurrent problem. The current episode started more than 1 month ago. The problem occurs intermittently. The problem has been waxing and waning. Associated symptoms include coughing. He has tried rest (exercise) for the symptoms. The treatment provided no relief.   Weakness - Generalized  This is a chronic problem. The current episode started more than 1 year ago. The problem occurs daily. The problem has been waxing and waning. Associated symptoms include coughing. Associated symptoms comments: Falls. Exacerbated by: Illness, Pneumonia, Dementia. Treatments tried: Hosp admit, Antibiotics.  The treatment provided mild relief.   Pneumonia  He complains of chest tightness, cough, difficulty breathing, sputum production and wheezing. There is no shortness of breath. Primary symptoms comments: H/O recurring, still have frequent cough after several tx with abx. Sinus drainage better.. This is a recurrent problem. The current episode started more than 1 month ago. The problem occurs intermittently. The problem has been waxing and waning. The cough is productive of sputum. Associated symptoms include postnasal drip. Pertinent negatives include no appetite change, ear pain, rhinorrhea, sneezing or trouble swallowing. His symptoms are aggravated by minimal activity. His symptoms are alleviated by beta-agonist, oral steroids, steroid inhaler and prescription cough suppressant (Antibiotics). He reports moderate improvement on treatment. His past medical history is significant for COPD and pneumonia.   Heart Problem  This is a chronic (CAD/MI, Stents, A-Fib, recent placement of Watchman device. )  problem. The current episode started more than 1 year ago. The problem occurs daily. The problem has been waxing and waning. Associated symptoms include coughing. The symptoms are aggravated by smoking. Treatments tried: Meds, Stenting, Watchman device. The treatment provided moderate relief.   Hypertension  This is a chronic problem. The current episode started more than 1 year ago. The problem has been waxing and waning since onset. The problem is controlled. Associated symptoms include palpitations. Pertinent negatives include no shortness of breath. Agents associated with hypertension include thyroid hormones. Risk factors for coronary artery disease include male gender and smoking/tobacco exposure. Past treatments include beta blockers. Current antihypertension treatment includes beta blockers. The current treatment provides significant improvement. Hypertensive end-organ damage includes CVA.   Anemia  Presents for follow-up visit. Symptoms include bruises/bleeds easily, confusion and palpitations. There has been no light-headedness or pallor. Past treatments include nothing.   Memory Loss  This is a chronic (Dementia per wife x 5-7 yrs.) problem. The current episode started more than 1 year ago. The problem occurs daily. The problem has been gradually worsening. Associated symptoms include coughing. The symptoms are aggravated by smoking (CVA, CAD). Treatments tried: Meds, Risk reduction. The treatment provided no relief.   Cerebrovascular Accident  This is a chronic (L sided weakness, impaired swallow improved.) problem. The current episode started more than 1 year ago (2 yrs ago). The problem occurs daily. The problem has been gradually improving. Associated symptoms include coughing. Exacerbated by: Cerebral small vessel disease. Treatments tried: PT/ OT. The treatment provided significant relief.   Foot Injury   The incident occurred more than 1 week ago. The incident occurred at home. The injury mechanism  "was a fall. The pain is present in the right ankle and right foot. The pain is at a severity of 3/10. The pain is moderate. The pain has been improving since onset. He has tried acetaminophen and non-weight bearing for the symptoms. The treatment provided mild relief.       Objective   Vital Signs:   /62 (BP Location: Left arm, Patient Position: Sitting, Cuff Size: Adult)   Pulse 81   Temp 96.9 °F (36.1 °C) (Temporal)   Ht 175.3 cm (69\")   Wt 86 kg (189 lb 11.2 oz)   SpO2 (!) 89%   BMI 28.01 kg/m²     Physical Exam  Vitals and nursing note reviewed.   Constitutional:       Appearance: He is ill-appearing.      Comments: Appearance improved from last visit.   HENT:      Head: Atraumatic.      Right Ear: Tympanic membrane, ear canal and external ear normal. There is no impacted cerumen.      Left Ear: Tympanic membrane and ear canal normal. There is no impacted cerumen.      Nose: Congestion present.      Mouth/Throat:      Mouth: Mucous membranes are moist.      Pharynx: No oropharyngeal exudate or posterior oropharyngeal erythema.   Eyes:      Comments: Blind OS   PRL OD   Cardiovascular:      Rate and Rhythm: Normal rate.      Pulses: Normal pulses.      Heart sounds: Murmur heard.     Pulmonary:      Effort: Pulmonary effort is normal. No respiratory distress.      Breath sounds: Wheezing and rhonchi present.      Comments: Greater on R  Abdominal:      General: There is distension.      Palpations: There is no mass.      Tenderness: There is no right CVA tenderness or left CVA tenderness.      Hernia: A hernia is present.      Comments: Bloated.   Musculoskeletal:         General: Swelling and tenderness present.      Cervical back: Normal range of motion.      Right lower leg: Edema present.      Left lower leg: Edema present.      Comments: Kyphosis, using walker, unsteady, strength 3-4/5 in ext.     L ankle and medial ankle less swollen , tenderness across top of foot and ankle improved from last " visit.   Skin:     Capillary Refill: Capillary refill takes 2 to 3 seconds.   Neurological:      Mental Status: He is oriented to person, place, and time.      Cranial Nerves: No cranial nerve deficit.      Sensory: No sensory deficit.      Motor: Weakness present.      Gait: Gait abnormal.      Comments: L arm base line strenght was 4/5 now 3/5    Psychiatric:         Mood and Affect: Mood normal.         Behavior: Behavior normal.         Thought Content: Thought content normal.      Comments: Mild-Mod Dementia.         Result Review :                 Assessment and Plan    Diagnoses and all orders for this visit:    1. Smoker (Primary)  -     varenicline (CHANTIX BIBI) 0.5 MG X 11 & 1 MG X 42 tablet; Take 0.5 mg po daily x 3 days, then 0.5 mg po bid x 4 days, then 1 mg po bid  Dispense: 53 tablet; Refill: 0  -     varenicline (Chantix Continuing Month Bibi) 1 MG tablet; Take 1 tablet by mouth 2 (Two) Times a Day for 56 days.  Dispense: 56 tablet; Refill: 1    2. Chest congestion  -     XR Chest PA & Lateral (In Office)    3. H/O: pneumonia  -     XR Chest PA & Lateral (In Office)    4. Chronic bronchitis, unspecified chronic bronchitis type (CMS/HCC)    5. Longstanding persistent atrial fibrillation (CMS/HCC)    6. GERD without esophagitis    7. Acute left ankle pain    Discussed exam, Rx Chantix smoking cessation x 3 mins. Discussed checking a follow up CXR, recurrent Pneumonia. Currently no fever, malaise will, no treat wit abx , has had repeated tx with abx and steroids, discussed F/U plan.    Follow Up   Return in about 6 weeks (around 6/21/2021).  Patient was given instructions and counseling regarding his condition or for health maintenance advice. Please see specific information pulled into the AVS if appropriate.           This document has been electronically signed by Bryce Collazo MD

## 2021-05-10 NOTE — TELEPHONE ENCOUNTER
Pt insurance does not cover chantix w/o a PA. Suggested alternatives that may not require authorization are bupropion, nicotine, or nicotine polacrilex.  Please advise.

## 2021-05-12 NOTE — TELEPHONE ENCOUNTER
----- Message from Bryce Collazo MD sent at 5/12/2021  8:14 AM CDT -----  Has a spot on R mid lung , needs checking with a CT scan, also would like to come by for a TB skin test.

## 2021-05-12 NOTE — TELEPHONE ENCOUNTER
Deja Moralesier, LPN   5/12/2021 11:22 AM CDT      Spoke with pt wife due to pt is hard of hearing.  Results given of abnormal chest xray. Mrs. Elise verbalized understanding of results, need for CT scan, and to bring pt to the office either today or Monday for TB skin test.

## 2021-05-12 NOTE — TELEPHONE ENCOUNTER
Pt came in today for his TB skin test.  CT of chest with contrast scheduled for 5/18/21 @ 9:00 am.  Pt wife notified of appt and that pt has to be NPO after midnight. She verbalized understanding.

## 2021-05-13 PROBLEM — Z87.01 H/O: PNEUMONIA: Status: ACTIVE | Noted: 2021-01-01

## 2021-05-13 PROBLEM — R09.89 CHEST CONGESTION: Status: ACTIVE | Noted: 2021-01-01

## 2021-06-10 NOTE — TELEPHONE ENCOUNTER
PATIENTS WIFE CALLED AND SAID THAT PATIENT HAD FALLEN NAD WAS HURTING IN HIS BACK AND MID SECTION. SPOKE TO [place] ABOUT IT AND SHE SAID THAT HE SHOULD GO TO THE ER.PATIENT'S WIFE WAS TOLD AND VOICED UNDERSTANDING

## 2021-06-16 NOTE — TELEPHONE ENCOUNTER
Ms Elise called in stating that she spoke with Dr Collazo last night.She took Mr Elise back to the ER and would like someone to give her a call.

## 2021-06-17 NOTE — TELEPHONE ENCOUNTER
Patients wife called she wanted to let Dr. Collazo know he is having surgery tomorrow at [facility] but coming home the same day.

## 2021-07-02 NOTE — PROGRESS NOTES
Chief Complaint  Hospital Follow Up Visit  ' Went to [place] for Abd distention, N/V. S/P stenting x 2 SMA, only slight improvement'  Subjective          Review of Systems   Constitutional: Positive for weight loss. Negative for activity change and unexpected weight change.   HENT: Negative for dental problem, drooling, ear discharge, facial swelling, hearing loss, mouth sores, nosebleeds, sinus pressure, sinus pain, tinnitus and voice change.         Dentures   Eyes: Positive for visual disturbance. Negative for pain.        R MD   Blind OS    Seen at [facility]     Seen by Dr. Stanton locally   Respiratory: Negative for apnea, choking, chest tightness and stridor.         COPD  Pneumonia resolved   Cardiovascular: Positive for palpitations. Negative for leg swelling.         A-fib.    Gastrointestinal: Positive for abdominal distention, abdominal pain, constipation and nausea. Negative for anal bleeding, blood in stool and rectal pain.        S/P  Stenting  SMA, has diffuse mesenteric vascular disease, surgeon said nothing else can be done .    Endocrine: Positive for cold intolerance. Negative for heat intolerance, polydipsia, polyphagia and polyuria.   Genitourinary: Positive for difficulty urinating. Negative for decreased urine volume, discharge, enuresis, flank pain, genital sores, penile swelling, scrotal swelling and testicular pain.        Umbilical hernia.    Musculoskeletal: Negative for gait problem and neck stiffness.        Chronic Foot pain   Skin: Negative for color change, pallor and wound.        Toe nail fungus.     H/O R toe surgery.     Moles on back   Allergic/Immunologic: Positive for environmental allergies. Negative for food allergies and immunocompromised state.   Neurological: Positive for dizziness and syncope. Negative for tremors, seizures, facial asymmetry, speech difficulty and light-headedness.        L arm jerks.    Hematological: Negative for adenopathy. Bruises/bleeds easily.    Psychiatric/Behavioral: Positive for confusion and hallucinations. Negative for agitation, behavioral problems, decreased concentration, dysphoric mood, self-injury, sleep disturbance and suicidal ideas. The patient is nervous/anxious. The patient is not hyperactive.        Luiz Elise presents to [facility]  Dizziness  This is a recurrent problem. The current episode started more than 1 month ago. The problem occurs intermittently. The problem has been waxing and waning. Associated symptoms include abdominal pain and nausea. He has tried rest (exercise) for the symptoms. The treatment provided no relief.   Weakness - Generalized  This is a chronic problem. The current episode started more than 1 year ago. The problem occurs daily. The problem has been waxing and waning. Associated symptoms include abdominal pain and nausea. Associated symptoms comments: Falls. Exacerbated by: Illness, Pneumonia, Dementia. Treatments tried: Hosp admit, Antibiotics.  The treatment provided mild relief.   Heart Problem  This is a chronic (CAD/MI, Stents, A-Fib, recent placement of Watchman device. ) problem. The current episode started more than 1 year ago. The problem occurs daily. The problem has been waxing and waning. Associated symptoms include abdominal pain and nausea. The symptoms are aggravated by smoking. Treatments tried: Meds, Stenting, Watchman device. The treatment provided moderate relief.   Hypertension  This is a chronic problem. The current episode started more than 1 year ago. The problem has been waxing and waning since onset. The problem is controlled. Associated symptoms include palpitations. Agents associated with hypertension include thyroid hormones. Risk factors for coronary artery disease include male gender and smoking/tobacco exposure. Past treatments include beta blockers. Current antihypertension treatment includes beta blockers. The current treatment provides significant  improvement. Hypertensive end-organ damage includes CVA.   Anemia  Presents for follow-up visit. Symptoms include abdominal pain, bruises/bleeds easily, confusion, palpitations and weight loss. There has been no light-headedness or pallor. Past treatments include nothing.   Memory Loss  This is a chronic (Dementia per wife x 5-7 yrs.) problem. The current episode started more than 1 year ago. The problem occurs daily. The problem has been gradually worsening. Associated symptoms include abdominal pain and nausea. The symptoms are aggravated by smoking (CVA, CAD). Treatments tried: Meds, Risk reduction. The treatment provided no relief.   Cerebrovascular Accident  This is a chronic (L sided weakness, impaired swallow improved.) problem. The current episode started more than 1 year ago (2 yrs ago). The problem occurs daily. The problem has been gradually improving. Associated symptoms include abdominal pain and nausea. Exacerbated by: Cerebral small vessel disease. Treatments tried: PT/ OT. The treatment provided significant relief.   Foot Injury   The incident occurred more than 1 week ago. The incident occurred at home. The injury mechanism was a fall. The pain is present in the right ankle and right foot. The pain is at a severity of 3/10. The pain is moderate. The pain has been improving since onset. He has tried acetaminophen and non-weight bearing for the symptoms. The treatment provided mild relief.   Abdominal Pain  This is a new problem. The current episode started more than 1 year ago. The onset quality is sudden. The problem occurs constantly. The problem has been gradually improving. The pain is at a severity of 5/10. The pain is moderate. The quality of the pain is a sensation of fullness. Associated symptoms include constipation, nausea and weight loss. Associated symptoms comments: Bloating. Treatments tried: Stenting of SMA. The treatment provided mild relief.       Objective   Vital Signs:   /60  "(BP Location: Right arm, Patient Position: Sitting, Cuff Size: Adult)   Pulse 80   Temp 97.8 °F (36.6 °C) (Temporal)   Ht 175.3 cm (69\")   Wt 83.3 kg (183 lb 9.6 oz)   SpO2 91%   BMI 27.11 kg/m²     Physical Exam  Vitals and nursing note reviewed.   Constitutional:       Appearance: He is ill-appearing.      Comments: Appearance improved from last visit.   HENT:      Head: Atraumatic.      Right Ear: Tympanic membrane, ear canal and external ear normal. There is no impacted cerumen.      Left Ear: Tympanic membrane and ear canal normal. There is no impacted cerumen.      Nose: Congestion present.      Mouth/Throat:      Mouth: Mucous membranes are moist.      Pharynx: No oropharyngeal exudate or posterior oropharyngeal erythema.   Eyes:      Comments: Blind OS   PRL OD   Cardiovascular:      Rate and Rhythm: Normal rate.      Pulses: Normal pulses.      Heart sounds: Murmur heard.     Pulmonary:      Effort: Pulmonary effort is normal. No respiratory distress.      Breath sounds: Wheezing and rhonchi present.      Comments: Greater on R  Abdominal:      General: There is distension.      Palpations: There is no mass.      Tenderness: There is no right CVA tenderness or left CVA tenderness.      Hernia: A hernia is present.      Comments: Bloated.   Musculoskeletal:         General: Swelling and tenderness present.      Cervical back: Normal range of motion.      Right lower leg: Edema present.      Left lower leg: Edema present.      Comments: Kyphosis, using walker, unsteady, strength 3-4/5 in ext.     L ankle and medial ankle less swollen , tenderness across top of foot and ankle improved from last visit.   Skin:     Capillary Refill: Capillary refill takes 2 to 3 seconds.   Neurological:      Mental Status: He is oriented to person, place, and time.      Cranial Nerves: No cranial nerve deficit.      Sensory: No sensory deficit.      Motor: Weakness present.      Gait: Gait abnormal.      Comments: L arm " base line strenght was 4/5 now 3/5    Psychiatric:         Mood and Affect: Mood normal.         Behavior: Behavior normal.         Thought Content: Thought content normal.      Comments: Mild-Mod Dementia.         Result Review :     Common labs    Common Labsle 2/2/21 2/2/21 2/2/21    1134 1134 1134   Glucose   92   BUN   5 (A)   Creatinine   0.84   eGFR Non African Am   88   Sodium   138   Potassium   3.1 (A)   Chloride   103   Calcium   8.6   Albumin   3.90   Total Bilirubin   0.4   Alkaline Phosphatase   67   AST (SGOT)   27   ALT (SGPT)   14   WBC 5.13     Hemoglobin 9.8 (A)     Hematocrit 31.2 (A)     Platelets 203     Total Cholesterol  131    Triglycerides  131    HDL Cholesterol  28 (A)    LDL Cholesterol   79    (A) Abnormal value            CMP    CMP 2/2/21   Glucose 92   BUN 5 (A)   Creatinine 0.84   eGFR Non African Am 88   Sodium 138   Potassium 3.1 (A)   Chloride 103   Calcium 8.6   Albumin 3.90   Total Bilirubin 0.4   Alkaline Phosphatase 67   AST (SGOT) 27   ALT (SGPT) 14   (A) Abnormal value            CBC    CBC 2/2/21   WBC 5.13   RBC 4.35   Hemoglobin 9.8 (A)   Hematocrit 31.2 (A)   MCV 71.7 (A)   MCH 22.5 (A)   MCHC 31.4 (A)   RDW 16.5 (A)   Platelets 203   (A) Abnormal value            CBC w/diff    CBC w/Diff 2/2/21   WBC 5.13   RBC 4.35   Hemoglobin 9.8 (A)   Hematocrit 31.2 (A)   MCV 71.7 (A)   MCH 22.5 (A)   MCHC 31.4 (A)   RDW 16.5 (A)   Platelets 203   (A) Abnormal value            Lipid Panel    Lipid Panel 2/2/21   Total Cholesterol 131   Triglycerides 131   HDL Cholesterol 28 (A)   VLDL Cholesterol 24   LDL Cholesterol  79   LDL/HDL Ratio 2.74   (A) Abnormal value            TSH    TSH 2/2/21   TSH 1.230               UA    Urinalysis 6/22/21 6/22/21    0611 0611   Specific [place], UA 1.013 (A)    Ketones, UA Negative    Blood, UA Negative    Leukocytes, UA Negative    Nitrite, UA Negative    RBC, UA  1   Bacteria, UA  None Seen   (A) Abnormal value            Data reviewed: Recent  hospitalization notes [place]          Assessment and Plan    Diagnoses and all orders for this visit:    1. Smoker    2. History of stroke    3. Dementia without behavioral disturbance, unspecified dementia type (CMS/HCC)    4. GERD without esophagitis    5. Chronic bronchitis, unspecified chronic bronchitis type (CMS/HCC)    6. Hypothyroidism, unspecified type    7. Superior mesenteric artery stenosis (CMS/HCC)  Comments:  S/P Stenting x 2        Follow Up   Return in about 3 months (around 10/2/2021).  Patient was given instructions and counseling regarding his condition or for health maintenance advice. Please see specific information pulled into the AVS if appropriate.           This document has been electronically signed by Bryce Collazo MD

## 2021-09-19 PROBLEM — K35.80 ACUTE APPENDICITIS: Status: ACTIVE | Noted: 2021-01-01

## 2021-09-19 NOTE — ANESTHESIA PROCEDURE NOTES
Airway  Urgency: elective    Date/Time: 9/19/2021 2:59 AM  Airway not difficult    General Information and Staff    Patient location during procedure: OR  CRNA: Edinson Chao CRNA    Indications and Patient Condition  Indications for airway management: airway protection    Preoxygenated: yes  Mask difficulty assessment: 0 - not attempted    Final Airway Details  Final airway type: endotracheal airway      Successful airway: ETT  Cuffed: yes   Successful intubation technique: direct laryngoscopy and RSI  Facilitating devices/methods: intubating stylet and cricoid pressure  Endotracheal tube insertion site: oral  Blade: Bishop  Blade size: 3  ETT size (mm): 7.0  Cormack-Lehane Classification: grade I - full view of glottis  Placement verified by: chest auscultation and capnometry   Measured from: lips  ETT/EBT  to lips (cm): 20  Number of attempts at approach: 1

## 2021-09-19 NOTE — ANESTHESIA PREPROCEDURE EVALUATION
Anesthesia Evaluation     Patient summary reviewed and Nursing notes reviewed   NPO Solid Status: Waived due to emergency  NPO Liquid Status: Waived due to emergency           Airway   No difficulty expected  Dental    (+) edentulous    Pulmonary    (+) pneumonia stable , a smoker Former, COPD moderate, decreased breath sounds,   Cardiovascular     ECG reviewed  Rhythm: regular  Rate: normal    (+) cardiac stents dysrhythmias Atrial Fib, CHF ,       Neuro/Psych  (+) TIA, CVA, psychiatric history Depression and Anxiety, dementia,     GI/Hepatic/Renal/Endo    (+)  GERD well controlled,      Musculoskeletal     Abdominal    Substance History      OB/GYN          Other   arthritis,                    Anesthesia Plan    ASA 4 - emergent     general   (Spoke to wife concerning possible post op ventilation due to recent DX pneumonia and COPD, but will try to extubate if possible. Verbalized understanding.)  intravenous induction     Anesthetic plan, all risks, benefits, and alternatives have been provided, discussed and informed consent has been obtained with: spouse/significant other.    Plan discussed with CRNA.

## 2021-09-19 NOTE — ANESTHESIA POSTPROCEDURE EVALUATION
Patient: Luiz Elise    Procedure Summary     Date: 09/19/21 Room / Location: [facility] OR 09 / [facility] OR    Anesthesia Start: 0251 Anesthesia Stop: 0456    Procedure: APPENDECTOMY LAPAROSCOPIC POSSIBLE OPEN (N/A Abdomen) Diagnosis:       Acute appendicitis, unspecified acute appendicitis type      (Acute appendicitis, unspecified acute appendicitis type [K35.80])    Surgeons: Saul Hughes MD Provider: Ednison Chao CRNA    Anesthesia Type: general ASA Status: 4 - Emergent          Anesthesia Type: general    Vitals  Vitals Value Taken Time   /70 09/19/21 0442   Temp 97.3 °F (36.3 °C) 09/19/21 0442   Pulse 128 09/19/21 0442   Resp 22 09/19/21 0442   SpO2 89 % 09/19/21 0442           Post Anesthesia Care and Evaluation    Patient location during evaluation: PACU  Patient participation: complete - patient participated  Level of consciousness: sleepy but conscious  Pain score: 0  Pain management: adequate  Airway patency: patent  Anesthetic complications: No anesthetic complications  PONV Status: none  Cardiovascular status: acceptable  Respiratory status: acceptable  Hydration status: acceptable    Comments: ---------------------------               09/19/21                      0442         ---------------------------   BP:          153/70         Pulse:       (!) 128        Resp:          22           Temp:   97.3 °F (36.3 °C)   SpO2:        (!) 89%       ---------------------------

## 2021-10-02 LAB
QT INTERVAL: 350 MS
QTC INTERVAL: 469 MS

## 2021-10-16 LAB
QT INTERVAL: 300 MS
QT INTERVAL: 516 MS
QTC INTERVAL: 404 MS
QTC INTERVAL: 602 MS

## 2022-03-15 NOTE — TELEPHONE ENCOUNTER
Patients wife called needing the Doctor to give her a call back. Her husbands stomach is hurting and he's not eating.   
Records requested from [facility]  
Request CT from [facility]   
Detail Level: Detailed
Photo Preface (Leave Blank If You Do Not Want): Photographs were obtained today

## (undated) DEVICE — ENDOPATH XCEL DILATING TIP TROCARS WITH STABILITY SLEEVES: Brand: ENDOPATH XCEL

## (undated) DEVICE — DRN WND JP RND W TROC SIL 19F 1/4IN

## (undated) DEVICE — PATIENT RETURN ELECTRODE, SINGLE-USE, CONTACT QUALITY MONITORING, ADULT, WITH 9FT CORD, FOR PATIENTS WEIGING OVER 33LBS. (15KG): Brand: MEGADYNE

## (undated) DEVICE — SUT SILK 2/0 SH 75CM 30IN BLK C016D

## (undated) DEVICE — PK LAP CHOLE LF 60

## (undated) DEVICE — ANTIBACTERIAL UNDYED BRAIDED (POLYGLACTIN 910), SYNTHETIC ABSORBABLE SUTURE: Brand: COATED VICRYL

## (undated) DEVICE — ENDOPATH XCEL BLADELESS TROCARS WITH STABILITY SLEEVES: Brand: ENDOPATH XCEL

## (undated) DEVICE — SUT ETHLN 3-0 FS118IN 663H

## (undated) DEVICE — GAUZE,SPONGE,4"X4",16PLY,XRAY,STRL,LF: Brand: MEDLINE

## (undated) DEVICE — UNDYED BRAIDED (POLYGLACTIN 910), SYNTHETIC ABSORBABLE SUTURE: Brand: COATED VICRYL

## (undated) DEVICE — CORE TRUMPET FOR SINGLE SOLUTION BAG: Brand: CORE DYNAMICS

## (undated) DEVICE — GLV SURG SENSICARE PI LF PF 7.5 GRN STRL

## (undated) DEVICE — MONOPOLAR METZENBAUM SCISSOR TIP, DISPOSABLE: Brand: MONOPOLAR METZENBAUM SCISSOR TIP, DISPOSABLE

## (undated) DEVICE — PROXIMATE SKIN STAPLERS (35 WIDE) CONTAINS 35 STAINLESS STEEL STAPLES (FIXED HEAD): Brand: PROXIMATE

## (undated) DEVICE — RESERVOIR,SUCTION,100CC,SILICONE: Brand: MEDLINE

## (undated) DEVICE — HARMONIC ACE +7 LAPAROSCOPIC SHEARS ADVANCED HEMOSTASIS 5MM DIAMETER 36CM SHAFT LENGTH  FOR USE WITH GRAY HAND PIECE ONLY: Brand: HARMONIC ACE

## (undated) DEVICE — TOTAL TRAY, 16FR 10ML SIL FOLEY, URN: Brand: MEDLINE

## (undated) DEVICE — SOL IRR NACL 0.9PCT BT 1000ML

## (undated) DEVICE — PENCL ES MEGADINE EZ/CLEAN BUTN W/HOLSTR 10FT

## (undated) DEVICE — STERILE POLYISOPRENE POWDER-FREE SURGICAL GLOVES WITH EMOLLIENT COATING: Brand: PROTEXIS

## (undated) DEVICE — ENDOPOUCH RETRIEVER SPECIMEN RETRIEVAL BAGS: Brand: ENDOPOUCH RETRIEVER

## (undated) DEVICE — GLV SURG SENSICARE PI PF LF 7 GRN STRL

## (undated) DEVICE — VISUALIZATION SYSTEM: Brand: CLEARIFY

## (undated) DEVICE — SOL IRRIG NACL 1000ML